# Patient Record
Sex: FEMALE | Race: WHITE | NOT HISPANIC OR LATINO | Employment: UNEMPLOYED | URBAN - METROPOLITAN AREA
[De-identification: names, ages, dates, MRNs, and addresses within clinical notes are randomized per-mention and may not be internally consistent; named-entity substitution may affect disease eponyms.]

---

## 2017-11-20 ENCOUNTER — OFFICE VISIT (OUTPATIENT)
Dept: OBGYN | Facility: CLINIC | Age: 52
End: 2017-11-20
Payer: COMMERCIAL

## 2017-11-20 VITALS
WEIGHT: 165 LBS | HEIGHT: 70 IN | BODY MASS INDEX: 23.62 KG/M2 | DIASTOLIC BLOOD PRESSURE: 66 MMHG | SYSTOLIC BLOOD PRESSURE: 107 MMHG | HEART RATE: 59 BPM

## 2017-11-20 DIAGNOSIS — Z12.4 SCREENING FOR CERVICAL CANCER: ICD-10-CM

## 2017-11-20 DIAGNOSIS — Z12.31 ENCOUNTER FOR SCREENING MAMMOGRAM FOR BREAST CANCER: ICD-10-CM

## 2017-11-20 DIAGNOSIS — Z01.411 ENCOUNTER FOR GYNECOLOGICAL EXAMINATION WITH ABNORMAL FINDING: Primary | ICD-10-CM

## 2017-11-20 DIAGNOSIS — N84.1 CERVICAL POLYP: ICD-10-CM

## 2017-11-20 DIAGNOSIS — Z23 NEED FOR PROPHYLACTIC VACCINATION AND INOCULATION AGAINST INFLUENZA: ICD-10-CM

## 2017-11-20 PROCEDURE — G0124 SCREEN C/V THIN LAYER BY MD: HCPCS | Performed by: OBSTETRICS & GYNECOLOGY

## 2017-11-20 PROCEDURE — 90686 IIV4 VACC NO PRSV 0.5 ML IM: CPT | Performed by: OBSTETRICS & GYNECOLOGY

## 2017-11-20 PROCEDURE — 99213 OFFICE O/P EST LOW 20 MIN: CPT | Mod: 25 | Performed by: OBSTETRICS & GYNECOLOGY

## 2017-11-20 PROCEDURE — 99386 PREV VISIT NEW AGE 40-64: CPT | Mod: 25 | Performed by: OBSTETRICS & GYNECOLOGY

## 2017-11-20 PROCEDURE — 87624 HPV HI-RISK TYP POOLED RSLT: CPT | Performed by: OBSTETRICS & GYNECOLOGY

## 2017-11-20 PROCEDURE — 90471 IMMUNIZATION ADMIN: CPT | Performed by: OBSTETRICS & GYNECOLOGY

## 2017-11-20 PROCEDURE — G0145 SCR C/V CYTO,THINLAYER,RESCR: HCPCS | Performed by: OBSTETRICS & GYNECOLOGY

## 2017-11-20 NOTE — MR AVS SNAPSHOT
After Visit Summary   11/20/2017    Joanie Coe    MRN: [id number]           Patient Information     Date Of Birth          1965        Visit Information        Provider Department      11/20/2017 1:00 PM My Mccollum MD [facility]        Today's Diagnoses     Encounter for gynecological examination with abnormal finding    -  1    Cervical polyp        Need for prophylactic vaccination and inoculation against influenza        Encounter for screening mammogram for breast cancer        Screening for cervical cancer          Care Instructions      Preventive Health Recommendations  Female Ages 50 - 64    Yearly exam: See your health care provider every year in order to  o Review health changes.   o Discuss preventive care.    o Review your medicines if your doctor has prescribed any.      Get a Pap test every three years (unless you have an abnormal result and your provider advises testing more often).    If you get Pap tests with HPV test, you only need to test every 5 years, unless you have an abnormal result.     You do not need a Pap test if your uterus was removed (hysterectomy) and you have not had cancer.    You should be tested each year for STDs (sexually transmitted diseases) if you're at risk.     Have a mammogram every 1 to 2 years.    Have a colonoscopy at age 50, or have a yearly FIT test (stool test). These exams screen for colon cancer.      Have a cholesterol test every 5 years, or more often if advised.    Have a diabetes test (fasting glucose) every three years. If you are at risk for diabetes, you should have this test more often.     If you are at risk for osteoporosis (brittle bone disease), think about having a bone density scan (DEXA).    Shots: Get a flu shot each year. Get a tetanus shot every 10 years.    Nutrition:     Eat at least 5 servings of fruits and vegetables each day.    Eat whole-grain bread, whole-wheat pasta and brown rice instead of  white grains and rice.    Talk to your provider about Calcium and Vitamin D.     Lifestyle    Exercise at least 150 minutes a week (30 minutes a day, 5 days a week). This will help you control your weight and prevent disease.    Limit alcohol to one drink per day.    No smoking.     Wear sunscreen to prevent skin cancer.     See your dentist every six months for an exam and cleaning.    See your eye doctor every 1 to 2 years.            Follow-ups after your visit        Future tests that were ordered for you today     Open Future Orders        Priority Expected Expires Ordered    MA Screening Digital Bilateral Routine  11/20/2018 11/20/2017            Who to contact     If you have questions or need follow up information about today's clinic visit or your schedule please contact [facility] directly at [phone].  Normal or non-critical lab and imaging results will be communicated to you by Clinip[employer] WorldWidehart, letter or phone within 4 business days after the clinic has received the results. If you do not hear from us within 7 days, please contact the clinic through [employer] WorldWidehart or phone. If you have a critical or abnormal lab result, we will notify you by phone as soon as possible.  Submit refill requests through Newsgrape or call your pharmacy and they will forward the refill request to us. Please allow 3 business days for your refill to be completed.          Additional Information About Your Visit        Newsgrape Information     Newsgrape gives you secure access to your electronic health record. If you see a primary care provider, you can also send messages to your care team and make appointments. If you have questions, please call your primary care clinic.  If you do not have a primary care provider, please call [phone] and they will assist you.        Care EveryWhere ID     This is your Care EveryWhere ID. This could be used by other organizations to access your [facility] medical records  [id number]       "  Your Vitals Were     Pulse Height BMI (Body Mass Index)             59 5' 10\" (1.778 m) 23.68 kg/m2          Blood Pressure from Last 3 Encounters:   11/20/17 107/66   07/11/14 108/67   03/28/14 110/81    Weight from Last 3 Encounters:   11/20/17 165 lb (74.8 kg)   07/11/14 159 lb (72.1 kg)   06/04/13 152 lb (68.9 kg)              We Performed the Following     FLU VAC, SPLIT VIRUS IM > 3 YO (QUADRIVALENT) [56061]     HPV High Risk Types DNA Cervical     Pap imaged thin layer screen with HPV - recommended age 30 - 65 years (select HPV order below)     Vaccine Administration, Initial [87536]        Primary Care Provider Office Phone # Fax #    Kalyani Edward [phone]       [facility] [address] [facility] [address]        Equal Access to Services     DIANA CACERES : Hadii aad ku hadasho Soomaali, waaxda luqadaha, qaybta kaalmada adeegyada, cory hinojosa . So [facility] [phone].    ATENCIÓN: Si habla español, tiene a piña disposición servicios gratuitos de asistencia lingüística. Llame al [phone].    We comply with applicable federal civil rights laws and [place] laws. We do not discriminate on the basis of race, color, national origin, age, disability, sex, sexual orientation, or gender identity.            Thank you!     Thank you for choosing [facility]  for your care. Our goal is always to provide you with excellent care. Hearing back from our patients is one way we can continue to improve our services. Please take a few minutes to complete the written survey that you may receive in the mail after your visit with us. Thank you!             Your Updated Medication List - Protect others around you: Learn how to safely use, store and throw away your medicines at www.disposemymeds.org.          This list is accurate as of: 11/20/17  1:39 PM.  Always use your most recent med list.                   Brand Name Dispense " Instructions for use Diagnosis    BIOTIN       Routine general medical examination at a health care facility       CLARITIN 10 MG tablet   Generic drug:  loratadine      1 TABLET DAILY    Preventative health care       ESTROVEN PO           FISH OIL PO           PROBIOTIC DAILY PO

## 2017-11-20 NOTE — NURSING NOTE
"Chief Complaint   Patient presents with     Physical     Flu     injection       Initial /66 (BP Location: Right arm, Patient Position: Chair, Cuff Size: Adult Regular)  Pulse 59  Ht 5' 10\" (1.778 m)  Wt 165 lb (74.8 kg)  BMI 23.68 kg/m2 Estimated body mass index is 23.68 kg/(m^2) as calculated from the following:    Height as of this encounter: 5' 10\" (1.778 m).    Weight as of this encounter: 165 lb (74.8 kg).  Medication Reconciliation: complete     Jeremy Berger CMA      "

## 2017-11-20 NOTE — PROGRESS NOTES
SUBJECTIVE:   CC: Joanie Coe is an 52 year old woman who presents for preventive health visit.   She had a cervical polyp removed in 2008; now having irregular bleeding again    Healthy Habits:    Do you get at least three servings of calcium containing foods daily (dairy, green leafy vegetables, etc.)? yes    Amount of exercise or daily activities, outside of work: 5 day(s) per week    Problems taking medications regularly No    Medication side effects: No    Have you had an eye exam in the past two years? yes    Do you see a dentist twice per year? yes    Do you have sleep apnea, excessive snoring or daytime drowsiness?yes - snore        Today's PHQ-2 Score:   PHQ-2 ( 1999 Pfizer) 11/20/2017 7/11/2014   Q1: Little interest or pleasure in doing things 0 0   Q2: Feeling down, depressed or hopeless 0 0   PHQ-2 Score 0 0       Abuse: Current or Past(Physical, Sexual or Emotional)- No  Do you feel safe in your environment - Yes    Social History   Substance Use Topics     Smoking status: Never Smoker     Smokeless tobacco: Never Used     Alcohol use Yes      Comment: occ     The patient does not drink >3 drinks per day nor >7 drinks per week.    Reviewed orders with patient.  Reviewed health maintenance and updated orders accordingly - Yes  Labs reviewed in EPIC  BP Readings from Last 3 Encounters:   11/20/17 107/66   07/11/14 108/67   03/28/14 110/81    Wt Readings from Last 3 Encounters:   11/20/17 165 lb (74.8 kg)   07/11/14 159 lb (72.1 kg)   06/04/13 152 lb (68.9 kg)                  Patient Active Problem List   Diagnosis     Bleeding between periods     CARDIOVASCULAR SCREENING; LDL GOAL LESS THAN 160     Routine general medical examination at a health care facility     Cervical polyp     Past Surgical History:   Procedure Laterality Date     COLONOSCOPY  3/28/2014    Procedure: COLONOSCOPY;  Colonoscopy;  Surgeon: Everett Gray MD;  Location: [facility]     HC TOOTH EXTRACTION W/FORCEP      root canal  "    PELVIS LAPAROSCOPY,DX         Social History   Substance Use Topics     Smoking status: Never Smoker     Smokeless tobacco: Never Used     Alcohol use Yes      Comment: occ     Family History   Problem Relation Age of Onset     DIABETES Father      Hypertension Father      Lipids Father      CEREBROVASCULAR DISEASE Father      Thyroid Disease Sister                Patient over age 50, mutual decision to screen reflected in health maintenance.      Pertinent mammograms are reviewed under the imaging tab.  History of abnormal Pap smear: NO - age 30- 65 PAP every 3 years recommended    Reviewed and updated as needed this visit by clinical staffTobacco  Allergies  Meds  Med Hx  Surg Hx  Fam Hx  Soc Hx        Reviewed and updated as needed this visit by Provider              ROS:  C: NEGATIVE for fever, chills, change in weight  I: NEGATIVE for worrisome rashes, moles or lesions  E: NEGATIVE for vision changes or irritation  ENT: NEGATIVE for ear, mouth and throat problems  R: NEGATIVE for significant cough or SOB  B: NEGATIVE for masses, tenderness or discharge  CV: NEGATIVE for chest pain, palpitations or peripheral edema  GI: NEGATIVE for nausea, abdominal pain, heartburn, or change in bowel habits  : NEGATIVE for unusual urinary or vaginal symptoms. No vaginal bleeding.  M: NEGATIVE for significant arthralgias or myalgia  N: NEGATIVE for weakness, dizziness or paresthesias  P: NEGATIVE for changes in mood or affect     OBJECTIVE:   /66 (BP Location: Right arm, Patient Position: Chair, Cuff Size: Adult Regular)  Pulse 59  Ht 5' 10\" (1.778 m)  Wt 165 lb (74.8 kg)  BMI 23.68 kg/m2  EXAM:  GENERAL: healthy, alert and no distress  EYES: Eyes grossly normal to inspection, PERRL and conjunctivae and sclerae normal  HENT: ear canals and TM's normal, nose and mouth without ulcers or lesions  NECK: no adenopathy, no asymmetry, masses, or scars and thyroid normal to palpation  RESP: lungs clear to " auscultation - no rales, rhonchi or wheezes  BREAST: normal without masses, tenderness or nipple discharge and no palpable axillary masses or adenopathy  CV: regular rate and rhythm, normal S1 S2, no S3 or S4, no murmur, click or rub, no peripheral edema and peripheral pulses strong  ABDOMEN: soft, nontender, no hepatosplenomegaly, no masses and bowel sounds normal   (female): normal female external genitalia, normal urethral meatus , vaginal mucosa pink, moist, well rugated, cervical polyp 2.5cm transversely oriented and uterus and adnexa normal in palpation; Pap taken  MS: no gross musculoskeletal defects noted, no edema  SKIN: no suspicious lesions or rashes  NEURO: Normal strength and tone, mentation intact and speech normal  PSYCH: mentation appears normal, affect normal/bright    ASSESSMENT/PLAN:       ICD-10-CM    1. Encounter for gynecological examination with abnormal finding Z01.411    2. Cervical polyp N84.1    3. Need for prophylactic vaccination and inoculation against influenza Z23 FLU VAC, SPLIT VIRUS IM > 3 YO (QUADRIVALENT) [70929]     Vaccine Administration, Initial [16380]   4. Encounter for screening mammogram for breast cancer Z12.31 MA Screening Digital Bilateral   5. Screening for cervical cancer Z12.4 Pap imaged thin layer screen with HPV - recommended age 30 - 65 years (select HPV order below)     HPV High Risk Types DNA Cervical       COUNSELING:   Reviewed preventive health counseling, as reflected in patient instructions  Special attention given to:        Treatment for recurrent cervical polyp; due to size and recurrence, I recommend LEEP excision under local anesthesia       Regular exercise       Healthy diet/nutrition       Vision screening       Immunizations    Vaccinated for: Influenza           Osteoporosis Prevention/Bone Health           reports that she has never smoked. She has never used smokeless tobacco.    Estimated body mass index is 23.68 kg/(m^2) as calculated from  "the following:    Height as of this encounter: 5' 10\" (1.778 m).    Weight as of this encounter: 165 lb (74.8 kg).         Counseling Resources:  ATP IV Guidelines  Pooled Cohorts Equation Calculator  Breast Cancer Risk Calculator  FRAX Risk Assessment  ICSI Preventive Guidelines  Dietary Guidelines for Americans, 2010  USDA's MyPlate  ASA Prophylaxis  Lung CA Screening    My Mccollum MD  [facility] Influenza Immunization Documentation    1.  Is the person to be vaccinated sick today?   No    2. Does the person to be vaccinated have an allergy to a component   of the vaccine?   No  Egg Allergy Algorithm Link    3. Has the person to be vaccinated ever had a serious reaction   to influenza vaccine in the past?   No    4. Has the person to be vaccinated ever had Guillain-Barré syndrome?   No    Form completed by Jeremy Berger CMA           "

## 2017-11-27 LAB
COPATH REPORT: NORMAL
PAP: NORMAL

## 2017-11-28 LAB
FINAL DIAGNOSIS: NORMAL
HPV HR 12 DNA CVX QL NAA+PROBE: NEGATIVE
HPV16 DNA SPEC QL NAA+PROBE: NEGATIVE
HPV18 DNA SPEC QL NAA+PROBE: NEGATIVE
SPECIMEN DESCRIPTION: NORMAL

## 2017-12-19 ENCOUNTER — HOSPITAL ENCOUNTER (OUTPATIENT)
Dept: MAMMOGRAPHY | Facility: CLINIC | Age: 52
Discharge: HOME | End: 2017-12-19
Attending: OBSTETRICS & GYNECOLOGY | Admitting: OBSTETRICS & GYNECOLOGY
Payer: COMMERCIAL

## 2017-12-19 DIAGNOSIS — Z12.31 ENCOUNTER FOR SCREENING MAMMOGRAM FOR BREAST CANCER: ICD-10-CM

## 2017-12-19 PROCEDURE — G0202 SCR MAMMO BI INCL CAD: HCPCS

## 2018-02-12 ENCOUNTER — OFFICE VISIT (OUTPATIENT)
Dept: OBGYN | Facility: CLINIC | Age: 53
End: 2018-02-12
Payer: COMMERCIAL

## 2018-02-12 VITALS
WEIGHT: 164 LBS | BODY MASS INDEX: 23.48 KG/M2 | HEART RATE: 61 BPM | SYSTOLIC BLOOD PRESSURE: 113 MMHG | HEIGHT: 70 IN | DIASTOLIC BLOOD PRESSURE: 73 MMHG | TEMPERATURE: 97 F | RESPIRATION RATE: 18 BRPM

## 2018-02-12 DIAGNOSIS — R87.619 ENDOMETRIAL CELLS ON CERVICAL PAP SMEAR INCONSISTENT W/LMP: ICD-10-CM

## 2018-02-12 DIAGNOSIS — N84.1 CERVICAL POLYP: Primary | ICD-10-CM

## 2018-02-12 PROCEDURE — 88307 TISSUE EXAM BY PATHOLOGIST: CPT | Performed by: OBSTETRICS & GYNECOLOGY

## 2018-02-12 PROCEDURE — 58100 BIOPSY OF UTERUS LINING: CPT | Performed by: OBSTETRICS & GYNECOLOGY

## 2018-02-12 PROCEDURE — 57522 CONIZATION OF CERVIX: CPT | Performed by: OBSTETRICS & GYNECOLOGY

## 2018-02-12 PROCEDURE — 88305 TISSUE EXAM BY PATHOLOGIST: CPT | Performed by: OBSTETRICS & GYNECOLOGY

## 2018-02-12 NOTE — PROGRESS NOTES
SUBJECTIVE:  Joanie is a 52 year old   female who presents for LEEP procedure.  Her last Pap was normal with endometrial cells on 11/17.   She has had a cervical polyp removed in the past, but it has recurred on last pelvic exam.*      PROCEDURE:    After obtaining written informed consent for both LEEP as well as endometrial biopsy,  the patient was placed in the supine position, and a coated speculum placed within the vagina to visualize the cervix.  The cervix was then treated with a Lugol's solution to identify the entire transformation zone.  The cervix was then infiltrated with a mixture of 1% lidocaine/2% lidocaine with epinephrine, until anesthesia was achieved.  Utilizing a Loop cautery, the entire polyp complex, including smaller polyps in the canal  was excised and sent for pathologic examination.  Hemostasis was obtained with monopolar cautery and Monsel's solution. The pt tolerated the procedure well.    The cervix was visualized with the speculum, and cleansed with an iodine solution.  The anterior cervix was grasped with a tenaculum and a pipelle advanced into the uterine cavity, with a sounded depth of 7 cm.  A representative sample was aspirated from the cavity and sent for pathological examination.      ASSESSMENT:    Cervical polyp  Endometrial cells on Pap smear    PLAN:  Will check the results of the pathology from LEEP and uterine biopsy and contact the patient.    Bleeding precautions were reviewed with the patient.  Post biopsy instructions reviewed  My Mccollum MD  [facility] [place]

## 2018-02-12 NOTE — MR AVS SNAPSHOT
"              After Visit Summary   2/12/2018    Joanie Coe    MRN: [id number]           Patient Information     Date Of Birth          1965        Visit Information        Provider Department      2/12/2018 2:30 PM My Mccollum MD; [facility]        Today's Diagnoses     Cervical polyp    -  1    Endometrial cells on cervical Pap smear inconsistent w/LMP           Follow-ups after your visit        Who to contact     If you have questions or need follow up information about today's clinic visit or your schedule please contact [facility] directly at [phone].  Normal or non-critical lab and imaging results will be communicated to you by MyChart, letter or phone within 4 business days after the clinic has received the results. If you do not hear from us within 7 days, please contact the clinic through VidPayhart or phone. If you have a critical or abnormal lab result, we will notify you by phone as soon as possible.  Submit refill requests through [employer] or call your pharmacy and they will forward the refill request to us. Please allow 3 business days for your refill to be completed.          Additional Information About Your Visit        MyChart Information     [employer] gives you secure access to your electronic health record. If you see a primary care provider, you can also send messages to your care team and make appointments. If you have questions, please call your primary care clinic.  If you do not have a primary care provider, please call [phone] and they will assist you.        Care EveryWhere ID     This is your Care EveryWhere ID. This could be used by other organizations to access your [facility] medical records  [id number]        Your Vitals Were     Pulse Temperature Respirations Height BMI (Body Mass Index)       61 97  F (36.1  C) (Tympanic) 18 5' 10\" (1.778 m) 23.53 kg/m2        Blood Pressure from Last 3 Encounters:   02/12/18 113/73 "   11/20/17 107/66   07/11/14 108/67    Weight from Last 3 Encounters:   02/12/18 164 lb (74.4 kg)   11/20/17 165 lb (74.8 kg)   07/11/14 159 lb (72.1 kg)              We Performed the Following     ENDOMETRIAL BIOPSY W/O CERVICAL DILATION     LEEP without Colposcopy     Surgical pathology exam        Primary Care Provider    Kalyani Edward MD       No address on file        Equal Access to Services     [facility] NICKI : Hadii aad ku hadasho Soomaali, waaxda luqadaha, qaybta kaalmada adeegyada, waxay idiin hayaan bre chatmanalyciajacob hinojosa . So [facility] [phone].    ATENCIÓN: Si laly hayes, tiene a piña disposición servicios gratuitos de asistencia lingüística. Llame al [phone].    We comply with applicable federal civil rights laws and [place] laws. We do not discriminate on the basis of race, color, national origin, age, disability, sex, sexual orientation, or gender identity.            Thank you!     Thank you for choosing [facility]  for your care. Our goal is always to provide you with excellent care. Hearing back from our patients is one way we can continue to improve our services. Please take a few minutes to complete the written survey that you may receive in the mail after your visit with us. Thank you!             Your Updated Medication List - Protect others around you: Learn how to safely use, store and throw away your medicines at www.disposemymeds.org.          This list is accurate as of 2/12/18  2:52 PM.  Always use your most recent med list.                   Brand Name Dispense Instructions for use Diagnosis    BIOTIN       Routine general medical examination at a health care facility       CLARITIN 10 MG tablet   Generic drug:  loratadine      1 TABLET DAILY    Preventative health care       ESTROVEN PO           FISH OIL PO           METAMUCIL FREE & NATURAL PO           PROBIOTIC DAILY PO

## 2018-02-15 LAB — COPATH REPORT: NORMAL

## 2018-06-29 ENCOUNTER — HOSPITAL ENCOUNTER (OUTPATIENT)
Dept: MRI IMAGING | Facility: CLINIC | Age: 53
Discharge: HOME | End: 2018-06-29
Attending: PODIATRIST | Admitting: PODIATRIST
Payer: COMMERCIAL

## 2018-06-29 DIAGNOSIS — M25.571 ANKLE PAIN, RIGHT: ICD-10-CM

## 2018-06-29 PROCEDURE — 73721 MRI JNT OF LWR EXTRE W/O DYE: CPT | Mod: RT

## 2018-07-09 ENCOUNTER — TRANSFERRED RECORDS (OUTPATIENT)
Dept: HEALTH INFORMATION MANAGEMENT | Facility: CLINIC | Age: 53
End: 2018-07-09

## 2018-07-23 ENCOUNTER — OFFICE VISIT (OUTPATIENT)
Dept: FAMILY MEDICINE | Facility: CLINIC | Age: 53
End: 2018-07-23
Payer: COMMERCIAL

## 2018-07-23 VITALS
DIASTOLIC BLOOD PRESSURE: 72 MMHG | WEIGHT: 164.6 LBS | RESPIRATION RATE: 14 BRPM | BODY MASS INDEX: 23.56 KG/M2 | SYSTOLIC BLOOD PRESSURE: 114 MMHG | HEART RATE: 72 BPM | HEIGHT: 70 IN | TEMPERATURE: 98.4 F | OXYGEN SATURATION: 98 %

## 2018-07-23 DIAGNOSIS — G89.29 CHRONIC PAIN OF RIGHT ANKLE: ICD-10-CM

## 2018-07-23 DIAGNOSIS — M25.571 CHRONIC PAIN OF RIGHT ANKLE: ICD-10-CM

## 2018-07-23 DIAGNOSIS — Z01.818 PREOP GENERAL PHYSICAL EXAM: Primary | ICD-10-CM

## 2018-07-23 PROCEDURE — 99213 OFFICE O/P EST LOW 20 MIN: CPT | Performed by: INTERNAL MEDICINE

## 2018-07-23 NOTE — PATIENT INSTRUCTIONS
Avoid ibuprofen for 1 day prior to surgery, avoid naproxen for 3 days prior, and avoid products containing aspirin for 1 week before surgery.  Tylenol is okay to take for pain if needed.     Before Your Surgery      Call your surgeon if there is any change in your health. This includes signs of a cold or flu (such as a sore throat, runny nose, cough, rash or fever).    Do not smoke, drink alcohol or take over the counter medicine (unless your surgeon or primary care doctor tells you to) for the 24 hours before and after surgery.    If you take prescribed drugs: Follow your doctor s orders about which medicines to take and which to stop until after surgery.    Eating and drinking prior to surgery: follow the instructions from your surgeon    Take a shower or bath the night before surgery. Use the soap your surgeon gave you to gently clean your skin. If you do not have soap from your surgeon, use your regular soap. Do not shave or scrub the surgery site.  Wear clean pajamas and have clean sheets on your bed.

## 2018-07-23 NOTE — MR AVS SNAPSHOT
After Visit Summary   7/23/2018    Joanie Coe    MRN: [id number]           Patient Information     Date Of Birth          1965        Visit Information        Provider Department      7/23/2018 4:40 PM Farhan Coyne MD [facility]        Today's Diagnoses     Preop general physical exam    -  1      Care Instructions    Avoid ibuprofen for 1 day prior to surgery, avoid naproxen for 3 days prior, and avoid products containing aspirin for 1 week before surgery.  Tylenol is okay to take for pain if needed.     Before Your Surgery      Call your surgeon if there is any change in your health. This includes signs of a cold or flu (such as a sore throat, runny nose, cough, rash or fever).    Do not smoke, drink alcohol or take over the counter medicine (unless your surgeon or primary care doctor tells you to) for the 24 hours before and after surgery.    If you take prescribed drugs: Follow your doctor s orders about which medicines to take and which to stop until after surgery.    Eating and drinking prior to surgery: follow the instructions from your surgeon    Take a shower or bath the night before surgery. Use the soap your surgeon gave you to gently clean your skin. If you do not have soap from your surgeon, use your regular soap. Do not shave or scrub the surgery site.  Wear clean pajamas and have clean sheets on your bed.           Follow-ups after your visit        Who to contact     If you have questions or need follow up information about today's clinic visit or your schedule please contact [facility] directly at [phone].  Normal or non-critical lab and imaging results will be communicated to you by MyChart, letter or phone within 4 business days after the clinic has received the results. If you do not hear from us within 7 days, please contact the clinic through MyChart or phone. If you have a critical or abnormal lab result, we will notify you by phone as soon  "as possible.  Submit refill requests through [employer] or call your pharmacy and they will forward the refill request to us. Please allow 3 business days for your refill to be completed.          Additional Information About Your Visit        [employer] Information     [employer] gives you secure access to your electronic health record. If you see a primary care provider, you can also send messages to your care team and make appointments. If you have questions, please call your primary care clinic.  If you do not have a primary care provider, please call [phone] and they will assist you.        Care EveryWhere ID     This is your Care EveryWhere ID. This could be used by other organizations to access your [facility] medical records  [id number]        Your Vitals Were     Pulse Temperature Respirations Height Pulse Oximetry BMI (Body Mass Index)    72 98.4  F (36.9  C) (Tympanic) 14 5' 10\" (1.778 m) 98% 23.62 kg/m2       Blood Pressure from Last 3 Encounters:   07/23/18 114/72   02/12/18 113/73   11/20/17 107/66    Weight from Last 3 Encounters:   07/23/18 164 lb 9.6 oz (74.7 kg)   02/12/18 164 lb (74.4 kg)   11/20/17 165 lb (74.8 kg)              Today, you had the following     No orders found for display       Primary Care Provider    Kalyani Edward MD       No address on file        Equal Access to Services     [facility]: Hadii aad ku hadasho Soomaali, waaxda luqadaha, qaybta kaalmada adeashleyda, cory hinojosa . So [facility] [phone].    ATENCIÓN: Si habla español, tiene a piña disposición servicios gratuitos de asistencia lingüística. Llame al [phone].    We comply with applicable federal civil rights laws and [place] laws. We do not discriminate on the basis of race, color, national origin, age, disability, sex, sexual orientation, or gender identity.            Thank you!     Thank you for choosing [facility]  for your care. Our goal is always to " provide you with excellent care. Hearing back from our patients is one way we can continue to improve our services. Please take a few minutes to complete the written survey that you may receive in the mail after your visit with us. Thank you!             Your Updated Medication List - Protect others around you: Learn how to safely use, store and throw away your medicines at www.disposemymeds.org.          This list is accurate as of 7/23/18  5:13 PM.  Always use your most recent med list.                   Brand Name Dispense Instructions for use Diagnosis    BIOTIN       Routine general medical examination at a health care facility       CLARITIN 10 MG tablet   Generic drug:  loratadine      1 TABLET DAILY    Preventative health care       ESTROVEN PO           FISH OIL PO           METAMUCIL FREE & NATURAL PO           PROBIOTIC DAILY PO

## 2018-07-23 NOTE — PROGRESS NOTES
[facility]  [address] [address]  [phone]  Dept: [phone]    PRE-OP EVALUATION:  Chief Complaint   Patient presents with     Pre-Op Exam     DOS 18 [facility], right ankle repair      Today's date: 2018    Joanie Coe (: 1965) presents for pre-operative evaluation assessment as requested by Dr. Javy Gross.  She requires evaluation and anesthesia risk assessment prior to undergoing surgery/procedure for treatment of lateral ligament issue .    Proposed Surgery/ Procedure: ankle scope, debridement lateral ligament  Date of Surgery/ Procedure: 18  Time of Surgery/ Procedure: unknown   Hospital/Surgical Facility: [facility]     Primary Physician: Kalyani Edward   Type of Anesthesia Anticipated: to be determined    Patient has a Health Care Directive or Living Will:  NO, patient requested information today, printed and given.     1. NO - Do you have a history of heart attack, stroke, stent, bypass or surgery on an artery in the head, neck, heart or legs?  2. NO - Do you ever have any pain or discomfort in your chest?  3. NO - Do you have a history of  Heart Failure?  4. NO - Are you troubled by shortness of breath when: walking on the level, up a slight hill or at night?  5. NO - Do you currently have a cold, bronchitis or other respiratory infection?  6. NO - Do you have a cough, shortness of breath or wheezing?  7. NO - Do you sometimes get pains in the calves of your legs when you walk?  8. NO - Do you or anyone in your family have previous history of blood clots?  9. NO - Do you or does anyone in your family have a serious bleeding problem such as prolonged bleeding following surgeries or cuts?  10. NO - Have you ever had problems with anemia or been told to take iron pills?  11. NO - Have you had any abnormal blood loss such as black, tarry or bloody stools, or abnormal vaginal bleeding?  12. NO - Have you ever had a  blood transfusion?  13. NO - Have you or any of your relatives ever had problems with anesthesia?  14. NO - Do you have sleep apnea, excessive snoring or daytime drowsiness?  15. NO - Do you have any prosthetic heart valves?  16. NO - Do you have prosthetic joints?  17. NO - Is there any chance that you may be pregnant?      HPI:     HPI related to upcoming procedure: Started having right lateral ankle pain in April after doing a lot of rock climbing.  She has been using a brace but pain continues and she reports that podiatry found that some ligaments were  from the bone so surgical repair is planned.  She is otherwise healthy and feeling well.         MEDICAL HISTORY:     Patient Active Problem List    Diagnosis Date Noted     Cervical polyp 11/20/2017     Priority: Medium     Removed in 2008; recurrent 2017  LEEP removal performed 2/12/2018         CARDIOVASCULAR SCREENING; LDL GOAL LESS THAN 160 10/31/2010     Priority: Medium      Past Medical History:   Diagnosis Date     NO ACTIVE PROBLEMS      Past Surgical History:   Procedure Laterality Date     COLONOSCOPY  3/28/2014    Procedure: COLONOSCOPY;  Colonoscopy;  Surgeon: Everett Gray MD;  Location: [facility]     HC TOOTH EXTRACTION W/FORCEP      root canal     PELVIS LAPAROSCOPY,DX       Current Outpatient Prescriptions   Medication Sig Dispense Refill     CLARITIN 10 MG OR TABS 1 TABLET DAILY       BIOTIN        Nutritional Supplements (ESTROVEN PO)        Omega-3 Fatty Acids (FISH OIL PO)        Probiotic Product (PROBIOTIC DAILY PO)        Psyllium (METAMUCIL FREE & NATURAL PO)        OTC products: NSAIDS    Allergies   Allergen Reactions     Nka [No Known Allergies]       Latex Allergy: NO    Social History   Substance Use Topics     Smoking status: Never Smoker     Smokeless tobacco: Never Used     Alcohol use Yes      Comment: occ     History   Drug Use No       REVIEW OF SYSTEMS:   Constitutional, neuro, ENT, endocrine, pulmonary,  "cardiac, gastrointestinal, genitourinary, musculoskeletal, integument and psychiatric systems are negative, except as otherwise noted.    EXAM:   /72 (BP Location: Right arm, Patient Position: Chair, Cuff Size: Adult Regular)  Pulse 72  Temp 98.4  F (36.9  C) (Tympanic)  Resp 14  Ht 5' 10\" (1.778 m)  Wt 164 lb 9.6 oz (74.7 kg)  SpO2 98%  BMI 23.62 kg/m2      GENERAL APPEARANCE: healthy, alert and no distress     HENT: normal ear canals and TMs, nose and mouth without ulcers or lesions     NECK: no adenopathy, no asymmetry, masses, or scars     RESP: lungs clear to auscultation - no rales, rhonchi or wheezes     CV: regular rates and rhythm, normal S1 S2, no S3 or S4 and no murmur, click or rub -     ABDOMEN:  soft, nontender, no HSM or masses and bowel sounds normal     MS: right ankle in brace, otherwise no gross deformities noted       NEURO: mentation intact and speech normal     PSYCH: mentation appears normal. and affect normal/bright     LYMPHATICS: No cervical or supraclavicular nodes     DIAGNOSTICS:   No labs or EKG required for low risk surgery (cataract, skin procedure, breast biopsy, etc)    No results for input(s): HGB, PLT, INR, NA, POTASSIUM, CR, A1C in the last 13921 hours.     IMPRESSION:   Reason for surgery/procedure: Right ankle scope with ligament repair  Diagnosis/reason for consult: Pre-op eval    The proposed surgical procedure is considered LOW risk.    REVISED CARDIAC RISK INDEX  The patient has the following serious cardiovascular risks for perioperative complications such as (MI, PE, VFib and 3  AV Block):  No serious cardiac risks  INTERPRETATION: 0 risks: Class I (very low risk - 0.4% complication rate)    The patient has the following additional risks for perioperative complications:  No identified additional risks      ICD-10-CM    1. Preop general physical exam Z01.818    2. Chronic pain of right ankle M25.571     G89.29        RECOMMENDATIONS:       --Patient is to take " all scheduled medications on the day of surgery EXCEPT for modifications listed below.    Anticoagulant or Antiplatelet Medication Use  NSAIDS: Ibuprofen (Motrin):         Stop one day prior to surgery        APPROVAL GIVEN to proceed with proposed procedure, without further diagnostic evaluation       Signed Electronically by: Farhan Coyne MD    Copy of this evaluation report is provided to requesting physician.    [facility] Preop Guidelines    Revised Cardiac Risk Index

## 2018-08-07 ENCOUNTER — ANESTHESIA EVENT (OUTPATIENT)
Dept: SURGERY | Facility: CLINIC | Age: 53
End: 2018-08-07
Payer: COMMERCIAL

## 2018-08-09 ENCOUNTER — ANESTHESIA (OUTPATIENT)
Dept: SURGERY | Facility: CLINIC | Age: 53
End: 2018-08-09
Payer: COMMERCIAL

## 2018-08-09 ENCOUNTER — HOSPITAL ENCOUNTER (OUTPATIENT)
Facility: CLINIC | Age: 53
Discharge: HOME | End: 2018-08-09
Attending: PODIATRIST | Admitting: PODIATRIST
Payer: COMMERCIAL

## 2018-08-09 VITALS
SYSTOLIC BLOOD PRESSURE: 125 MMHG | TEMPERATURE: 97.4 F | WEIGHT: 164 LBS | RESPIRATION RATE: 16 BRPM | DIASTOLIC BLOOD PRESSURE: 68 MMHG | OXYGEN SATURATION: 99 % | BODY MASS INDEX: 23.48 KG/M2 | HEIGHT: 70 IN

## 2018-08-09 DIAGNOSIS — Z98.890 STATUS POST REPAIR OF LIGAMENT OF ANKLE: Primary | ICD-10-CM

## 2018-08-09 PROCEDURE — 37000008 ZZH ANESTHESIA TECHNICAL FEE, 1ST 30 MIN: Performed by: PODIATRIST

## 2018-08-09 PROCEDURE — 25000128 H RX IP 250 OP 636: Performed by: NURSE ANESTHETIST, CERTIFIED REGISTERED

## 2018-08-09 PROCEDURE — 40000305 ZZH STATISTIC PRE PROC ASSESS I: Performed by: PODIATRIST

## 2018-08-09 PROCEDURE — 37000009 ZZH ANESTHESIA TECHNICAL FEE, EACH ADDTL 15 MIN: Performed by: PODIATRIST

## 2018-08-09 PROCEDURE — 25000125 ZZHC RX 250: Performed by: NURSE ANESTHETIST, CERTIFIED REGISTERED

## 2018-08-09 PROCEDURE — 71000027 ZZH RECOVERY PHASE 2 EACH 15 MINS: Performed by: PODIATRIST

## 2018-08-09 PROCEDURE — 36000093 ZZH SURGERY LEVEL 4 1ST 30 MIN: Performed by: PODIATRIST

## 2018-08-09 PROCEDURE — 25000128 H RX IP 250 OP 636: Performed by: PODIATRIST

## 2018-08-09 PROCEDURE — 25000564 ZZH DESFLURANE, EA 15 MIN: Performed by: PODIATRIST

## 2018-08-09 PROCEDURE — C1713 ANCHOR/SCREW BN/BN,TIS/BN: HCPCS | Performed by: PODIATRIST

## 2018-08-09 PROCEDURE — 25000125 ZZHC RX 250: Performed by: PODIATRIST

## 2018-08-09 PROCEDURE — 27210794 ZZH OR GENERAL SUPPLY STERILE: Performed by: PODIATRIST

## 2018-08-09 PROCEDURE — 36000063 ZZH SURGERY LEVEL 4 EA 15 ADDTL MIN: Performed by: PODIATRIST

## 2018-08-09 PROCEDURE — 71000012 ZZH RECOVERY PHASE 1 LEVEL 1 FIRST HR: Performed by: PODIATRIST

## 2018-08-09 DEVICE — IMP KIT REPAIR LIGAMENT AUGMENTATION INT BRACE AR-1688-CP: Type: IMPLANTABLE DEVICE | Site: ANKLE | Status: FUNCTIONAL

## 2018-08-09 DEVICE — IMPLANTABLE DEVICE: Type: IMPLANTABLE DEVICE | Site: ANKLE | Status: FUNCTIONAL

## 2018-08-09 RX ORDER — CEFAZOLIN SODIUM 2 G/100ML
2 INJECTION, SOLUTION INTRAVENOUS
Status: COMPLETED | OUTPATIENT
Start: 2018-08-09 | End: 2018-08-09

## 2018-08-09 RX ORDER — FENTANYL CITRATE 50 UG/ML
INJECTION, SOLUTION INTRAMUSCULAR; INTRAVENOUS PRN
Status: DISCONTINUED | OUTPATIENT
Start: 2018-08-09 | End: 2018-08-09

## 2018-08-09 RX ORDER — ONDANSETRON 2 MG/ML
INJECTION INTRAMUSCULAR; INTRAVENOUS PRN
Status: DISCONTINUED | OUTPATIENT
Start: 2018-08-09 | End: 2018-08-09

## 2018-08-09 RX ORDER — ROPIVACAINE HYDROCHLORIDE 5 MG/ML
INJECTION, SOLUTION EPIDURAL; INFILTRATION; PERINEURAL PRN
Status: DISCONTINUED | OUTPATIENT
Start: 2018-08-09 | End: 2018-08-09

## 2018-08-09 RX ORDER — HYDROXYZINE HYDROCHLORIDE 25 MG/1
25 TABLET, FILM COATED ORAL EVERY 6 HOURS PRN
Qty: 26 TABLET | Refills: 0 | Status: SHIPPED | OUTPATIENT
Start: 2018-08-09 | End: 2021-02-19

## 2018-08-09 RX ORDER — SODIUM CHLORIDE, SODIUM LACTATE, POTASSIUM CHLORIDE, CALCIUM CHLORIDE 600; 310; 30; 20 MG/100ML; MG/100ML; MG/100ML; MG/100ML
INJECTION, SOLUTION INTRAVENOUS CONTINUOUS
Status: DISCONTINUED | OUTPATIENT
Start: 2018-08-09 | End: 2018-08-09 | Stop reason: HOSPADM

## 2018-08-09 RX ORDER — GLYCOPYRROLATE 0.2 MG/ML
INJECTION, SOLUTION INTRAMUSCULAR; INTRAVENOUS PRN
Status: DISCONTINUED | OUTPATIENT
Start: 2018-08-09 | End: 2018-08-09

## 2018-08-09 RX ORDER — ONDANSETRON 2 MG/ML
4 INJECTION INTRAMUSCULAR; INTRAVENOUS EVERY 30 MIN PRN
Status: DISCONTINUED | OUTPATIENT
Start: 2018-08-09 | End: 2018-08-09 | Stop reason: HOSPADM

## 2018-08-09 RX ORDER — LIDOCAINE HYDROCHLORIDE AND EPINEPHRINE 15; 5 MG/ML; UG/ML
INJECTION, SOLUTION EPIDURAL PRN
Status: DISCONTINUED | OUTPATIENT
Start: 2018-08-09 | End: 2018-08-09

## 2018-08-09 RX ORDER — ALBUTEROL SULFATE 0.83 MG/ML
2.5 SOLUTION RESPIRATORY (INHALATION) EVERY 4 HOURS PRN
Status: DISCONTINUED | OUTPATIENT
Start: 2018-08-09 | End: 2018-08-09 | Stop reason: HOSPADM

## 2018-08-09 RX ORDER — NALOXONE HYDROCHLORIDE 0.4 MG/ML
.1-.4 INJECTION, SOLUTION INTRAMUSCULAR; INTRAVENOUS; SUBCUTANEOUS
Status: DISCONTINUED | OUTPATIENT
Start: 2018-08-09 | End: 2018-08-09 | Stop reason: HOSPADM

## 2018-08-09 RX ORDER — PROPOFOL 10 MG/ML
INJECTION, EMULSION INTRAVENOUS PRN
Status: DISCONTINUED | OUTPATIENT
Start: 2018-08-09 | End: 2018-08-09

## 2018-08-09 RX ORDER — OXYCODONE AND ACETAMINOPHEN 5; 325 MG/1; MG/1
1 TABLET ORAL
Status: DISCONTINUED | OUTPATIENT
Start: 2018-08-09 | End: 2018-08-09 | Stop reason: HOSPADM

## 2018-08-09 RX ORDER — FENTANYL CITRATE 50 UG/ML
25-50 INJECTION, SOLUTION INTRAMUSCULAR; INTRAVENOUS
Status: DISCONTINUED | OUTPATIENT
Start: 2018-08-09 | End: 2018-08-09 | Stop reason: HOSPADM

## 2018-08-09 RX ORDER — BUPIVACAINE HYDROCHLORIDE 5 MG/ML
INJECTION, SOLUTION PERINEURAL PRN
Status: DISCONTINUED | OUTPATIENT
Start: 2018-08-09 | End: 2018-08-09 | Stop reason: HOSPADM

## 2018-08-09 RX ORDER — ONDANSETRON 4 MG/1
4 TABLET, ORALLY DISINTEGRATING ORAL EVERY 30 MIN PRN
Status: DISCONTINUED | OUTPATIENT
Start: 2018-08-09 | End: 2018-08-09 | Stop reason: HOSPADM

## 2018-08-09 RX ORDER — OXYCODONE AND ACETAMINOPHEN 5; 325 MG/1; MG/1
1-2 TABLET ORAL EVERY 4 HOURS PRN
Qty: 26 TABLET | Refills: 0 | Status: SHIPPED | OUTPATIENT
Start: 2018-08-09 | End: 2021-02-19

## 2018-08-09 RX ORDER — HYDROMORPHONE HYDROCHLORIDE 1 MG/ML
.3-.5 INJECTION, SOLUTION INTRAMUSCULAR; INTRAVENOUS; SUBCUTANEOUS EVERY 10 MIN PRN
Status: DISCONTINUED | OUTPATIENT
Start: 2018-08-09 | End: 2018-08-09 | Stop reason: HOSPADM

## 2018-08-09 RX ORDER — CEFAZOLIN SODIUM 1 G/50ML
1 INJECTION, SOLUTION INTRAVENOUS SEE ADMIN INSTRUCTIONS
Status: DISCONTINUED | OUTPATIENT
Start: 2018-08-09 | End: 2018-08-09 | Stop reason: HOSPADM

## 2018-08-09 RX ORDER — MEPERIDINE HYDROCHLORIDE 50 MG/ML
12.5 INJECTION INTRAMUSCULAR; INTRAVENOUS; SUBCUTANEOUS
Status: DISCONTINUED | OUTPATIENT
Start: 2018-08-09 | End: 2018-08-09 | Stop reason: HOSPADM

## 2018-08-09 RX ORDER — LIDOCAINE HYDROCHLORIDE 10 MG/ML
INJECTION, SOLUTION EPIDURAL; INFILTRATION; INTRACAUDAL; PERINEURAL PRN
Status: DISCONTINUED | OUTPATIENT
Start: 2018-08-09 | End: 2018-08-09

## 2018-08-09 RX ORDER — LIDOCAINE 40 MG/G
CREAM TOPICAL
Status: DISCONTINUED | OUTPATIENT
Start: 2018-08-09 | End: 2018-08-09 | Stop reason: HOSPADM

## 2018-08-09 RX ORDER — LIDOCAINE HYDROCHLORIDE 10 MG/ML
INJECTION, SOLUTION INFILTRATION; PERINEURAL PRN
Status: DISCONTINUED | OUTPATIENT
Start: 2018-08-09 | End: 2018-08-09

## 2018-08-09 RX ORDER — DEXAMETHASONE SODIUM PHOSPHATE 4 MG/ML
INJECTION, SOLUTION INTRA-ARTICULAR; INTRALESIONAL; INTRAMUSCULAR; INTRAVENOUS; SOFT TISSUE PRN
Status: DISCONTINUED | OUTPATIENT
Start: 2018-08-09 | End: 2018-08-09

## 2018-08-09 RX ADMIN — FENTANYL CITRATE 150 MCG: 50 INJECTION, SOLUTION INTRAMUSCULAR; INTRAVENOUS at 13:43

## 2018-08-09 RX ADMIN — ONDANSETRON 4 MG: 2 INJECTION INTRAMUSCULAR; INTRAVENOUS at 17:24

## 2018-08-09 RX ADMIN — GLYCOPYRROLATE 0.2 MG: 0.2 INJECTION, SOLUTION INTRAMUSCULAR; INTRAVENOUS at 13:43

## 2018-08-09 RX ADMIN — MIDAZOLAM HYDROCHLORIDE 2 MG: 1 INJECTION, SOLUTION INTRAMUSCULAR; INTRAVENOUS at 13:09

## 2018-08-09 RX ADMIN — CEFAZOLIN SODIUM 2 G: 2 INJECTION, SOLUTION INTRAVENOUS at 13:35

## 2018-08-09 RX ADMIN — MIDAZOLAM HYDROCHLORIDE 2 MG: 1 INJECTION, SOLUTION INTRAMUSCULAR; INTRAVENOUS at 13:35

## 2018-08-09 RX ADMIN — SODIUM CHLORIDE, POTASSIUM CHLORIDE, SODIUM LACTATE AND CALCIUM CHLORIDE: 600; 310; 30; 20 INJECTION, SOLUTION INTRAVENOUS at 14:24

## 2018-08-09 RX ADMIN — ONDANSETRON 4 MG: 2 INJECTION INTRAMUSCULAR; INTRAVENOUS at 13:43

## 2018-08-09 RX ADMIN — SODIUM CHLORIDE, POTASSIUM CHLORIDE, SODIUM LACTATE AND CALCIUM CHLORIDE: 600; 310; 30; 20 INJECTION, SOLUTION INTRAVENOUS at 12:38

## 2018-08-09 RX ADMIN — ROPIVACAINE HYDROCHLORIDE 10 ML: 5 INJECTION, SOLUTION EPIDURAL; INFILTRATION; PERINEURAL at 13:20

## 2018-08-09 RX ADMIN — LIDOCAINE HYDROCHLORIDE AND EPINEPHRINE 5 ML: 15; 5 INJECTION, SOLUTION EPIDURAL at 13:17

## 2018-08-09 RX ADMIN — LIDOCAINE HYDROCHLORIDE 1 ML: 10 INJECTION, SOLUTION EPIDURAL; INFILTRATION; INTRACAUDAL; PERINEURAL at 12:38

## 2018-08-09 RX ADMIN — FENTANYL CITRATE 100 MCG: 50 INJECTION, SOLUTION INTRAMUSCULAR; INTRAVENOUS at 13:09

## 2018-08-09 RX ADMIN — ROPIVACAINE HYDROCHLORIDE 25 ML: 5 INJECTION, SOLUTION EPIDURAL; INFILTRATION; PERINEURAL at 13:18

## 2018-08-09 RX ADMIN — PROPOFOL 150 MG: 10 INJECTION, EMULSION INTRAVENOUS at 13:43

## 2018-08-09 RX ADMIN — MIDAZOLAM HYDROCHLORIDE 1 MG: 1 INJECTION, SOLUTION INTRAMUSCULAR; INTRAVENOUS at 13:43

## 2018-08-09 RX ADMIN — LIDOCAINE HYDROCHLORIDE 2 ML: 10 INJECTION, SOLUTION EPIDURAL; INFILTRATION; INTRACAUDAL; PERINEURAL at 13:15

## 2018-08-09 RX ADMIN — DEXAMETHASONE SODIUM PHOSPHATE 4 MG: 4 INJECTION, SOLUTION INTRA-ARTICULAR; INTRALESIONAL; INTRAMUSCULAR; INTRAVENOUS; SOFT TISSUE at 13:43

## 2018-08-09 RX ADMIN — LIDOCAINE HYDROCHLORIDE 50 MG: 10 INJECTION, SOLUTION INFILTRATION; PERINEURAL at 13:43

## 2018-08-09 NOTE — ANESTHESIA PREPROCEDURE EVALUATION
Anesthesia Evaluation     . Pt has had prior anesthetic. Type: General    No history of anesthetic complications          ROS/MED HX    ENT/Pulmonary:  - neg pulmonary ROS     Neurologic:  - neg neurologic ROS     Cardiovascular:  - neg cardiovascular ROS       METS/Exercise Tolerance:  >4 METS   Hematologic:  - neg hematologic  ROS       Musculoskeletal:  - neg musculoskeletal ROS       GI/Hepatic:  - neg GI/hepatic ROS       Renal/Genitourinary:         Endo:  - neg endo ROS       Psychiatric:  - neg psychiatric ROS       Infectious Disease:  - neg infectious disease ROS       Malignancy:      - no malignancy   Other:    - neg other ROS                 Physical Exam  Normal systems: pulmonary    Airway   Mallampati: II  TM distance: >3 FB  Neck ROM: full    Dental     Cardiovascular       Pulmonary                     Anesthesia Plan      History & Physical Review      ASA Status:  1 .    NPO Status:  > 6 hours    Plan for General, LMA and Periph. Nerve Block for postop pain with Intravenous and Propofol induction. Maintenance will be Balanced.    PONV prophylaxis:  Ondansetron (or other 5HT-3) and Dexamethasone or Solumedrol       Postoperative Care  Postoperative pain management:  IV analgesics, Oral pain medications and Peripheral nerve block (Single Shot).      Consents  Anesthetic plan, risks, benefits and alternatives discussed with:  Patient..                          .

## 2018-08-09 NOTE — DISCHARGE INSTRUCTIONS
Same Day Surgery Discharge Instructions  Special Precautions After Surgery - Adult    1. It is not unusual to feel lightheaded or faint, up to 24 hours after surgery or while taking pain medication.  If you have these symptoms; sit for a few minutes before standing and have someone assist you when getting up.  2. You should rest and relax for the next 24 hours and must have someone stay with you for at least 24 hours after your discharge.  3. DO NOT DRIVE any vehicle or operate mechanical equipment for 24 hours following the end of your surgery.  DO NOT DRIVE while taking narcotic pain medications that have been prescribed by your physician.  If you had a limb operated on, you must be able to use it fully to drive.  4. DO NOT drink alcoholic beverages for 24 hours following surgery or while taking prescription pain medication.  5. Drink clear liquids (apple juice, ginger ale, broth, 7-Up, etc.).  Progress to your regular diet as you feel able.  6. Any questions call your physician and do not make important decisions for 24 hours.    ACTIVITY  ? Rest today.  No activity or diet restrictions.  ? Resume activity as tolerated.  ? Restrictions: per MD orders and anesthesia   ? See printed discharge sheet.     INCISIONAL CARE  ? Do not remove dressing until seen by physician.  ? Keep extremity elevated above the level of the heart if possible. .  ? Apply ice 1/2 hour on and 1/2 hour off while awake.     Call for an appointment to return to the clinic in 10-14 days.    Medications:  ? Acetaminophen & Oxycodone (Percocet):  Next dose: as needed but start tonight and take as scheduled till the block wears off and then as needed .  ? Hydroxyzine (Vistaril):  Next dose:  as needed but start tonight and take as scheduled till the block wears off and then as needed.  ? Ibuprofen (Motrin, Advil):  Next dose: as a supplement to the narcotics for pain control.  ? aspirin:  Next dose: August10th and then  daily.  ? Stool softener to prevent constipation   ? Follow the instructions on the bottle.     Additional discharge instructions: Follow anesthesia block instructions   __________________________________________________________________________________________________________________________________  IMPORTANT NUMBERS:    [facility] Main Number:  [phone], [phone]  Pharmacy:  [phone]  Same Day Surgery:  [phone], Monday - Friday until 8:30 p.m.  Urgent Care:  [phone]  Emergency Room:  [phone]                                                                            [facility]:  [phone] option 1  Home Medical Equipment: [phone]

## 2018-08-09 NOTE — ANESTHESIA POSTPROCEDURE EVALUATION
Patient: Joanie Coe    Procedure(s):  Right Ankle Arthroscopic Evaluation & Debridement &  Lateral Ligament Repair With Internal Augmentation &  Peroneal Tendon Repair & Great Toe Tendon Decompression - Wound Class: I-Clean    Diagnosis:Right ankle impingement and instability.  Diagnosis Additional Information: No value filed.    Anesthesia Type:  No value filed.    Note:  Anesthesia Post Evaluation    Patient location during evaluation: Bedside  Patient participation: Able to participate in evaluation but full recovery from regional anesthesia has not yet ocurrred but is anticipated to occur within 48 hours  Level of consciousness: awake and alert  Pain management: adequate  Airway patency: patent  Cardiovascular status: acceptable  Respiratory status: acceptable  Hydration status: acceptable  PONV: none     Anesthetic complications: None          Last vitals:  Vitals:    08/09/18 1159 08/09/18 1520 08/09/18 1530   BP: (!) 126/91 122/66 119/74   Resp: 16 16 16   Temp: 36.9  C (98.4  F) 36.4  C (97.6  F)    SpO2: 100% 100% 100%         Electronically Signed By: MARY LOU Hernández CRNA  August 9, 2018  3:48 PM

## 2018-08-09 NOTE — ANESTHESIA CARE TRANSFER NOTE
Patient: Joanie Coe    Procedure(s):  Right Ankle Arthroscopic Evaluation & Debridement &  Lateral Ligament Repair With Internal Augmentation &  Peroneal Tendon Repair & Great Toe Tendon Decompression - Wound Class: I-Clean    Diagnosis: Right ankle impingement and instability.  Diagnosis Additional Information: No value filed.    Anesthesia Type:   No value filed.     Note:  Airway :Nasal Cannula  Patient transferred to:PACU  Handoff Report: Identifed the Patient, Identified the Reponsible Provider, Reviewed the pertinent medical history, Discussed the surgical course, Reviewed Intra-OP anesthesia mangement and issues during anesthesia, Set expectations for post-procedure period and Allowed opportunity for questions and acknowledgement of understanding      Vitals: (Last set prior to Anesthesia Care Transfer)    CRNA VITALS  8/9/2018 1449 - 8/9/2018 1524      8/9/2018             Pulse: 90    SpO2: 100 %                Electronically Signed By: MARY LOU Hernández CRNA  August 9, 2018  3:24 PM

## 2018-08-09 NOTE — IP AVS SNAPSHOT
MRN:[id number]                      After Visit Summary   8/9/2018    Joanie Coe    MRN: [id number]           Thank you!     Thank you for choosing [facility] for your care. Our goal is always to provide you with excellent care. Hearing back from our patients is one way we can continue to improve our services. Please take a few minutes to complete the written survey that you may receive in the mail after you visit with us. Thank you!        Patient Information     Date Of Birth          1965        About your hospital stay     You were admitted on:  August 9, 2018 You last received care in the:  [facility] PreOP/Phase II    You were discharged on:  August 9, 2018       Who to Call     For medical emergencies, please call 911.  For non-urgent questions about your medical care, please call your primary care provider or clinic, None  For questions related to your surgery, please call your surgery clinic        Attending Provider     Provider Javy Bear DPM Podiatry       Primary Care Provider    Kalyani Edward MD      After Care Instructions     Discharge Instructions       Review discharge instructions as directed by Provider.            Discharge Instructions       Patient to be seen in next 10-14 days.    Please call [facility] at [phone] to make/confirm appointment.            Elevate affected extremity           Ice to affected area       Ice pack to affected area PRN (as needed).            No dressing change       until follow up clinic appointment.            No driving or operating machinery       until the day after procedure            No weight bearing                 Further instructions from your care team                           Same Day Surgery Discharge Instructions  Special Precautions After Surgery - Adult    1. It is not unusual to feel lightheaded or faint, up to 24 hours after surgery or while taking pain medication.   If you have these symptoms; sit for a few minutes before standing and have someone assist you when getting up.  2. You should rest and relax for the next 24 hours and must have someone stay with you for at least 24 hours after your discharge.  3. DO NOT DRIVE any vehicle or operate mechanical equipment for 24 hours following the end of your surgery.  DO NOT DRIVE while taking narcotic pain medications that have been prescribed by your physician.  If you had a limb operated on, you must be able to use it fully to drive.  4. DO NOT drink alcoholic beverages for 24 hours following surgery or while taking prescription pain medication.  5. Drink clear liquids (apple juice, ginger ale, broth, 7-Up, etc.).  Progress to your regular diet as you feel able.  6. Any questions call your physician and do not make important decisions for 24 hours.    ACTIVITY  ? Rest today.  No activity or diet restrictions.  ? Resume activity as tolerated.  ? Restrictions: per MD orders and anesthesia   ? See printed discharge sheet.     INCISIONAL CARE  ? Do not remove dressing until seen by physician.  ? Keep extremity elevated above the level of the heart if possible. .  ? Apply ice 1/2 hour on and 1/2 hour off while awake.     Call for an appointment to return to the clinic in 10-14 days.    Medications:  ? Acetaminophen & Oxycodone (Percocet):  Next dose: as needed but start tonight and take as scheduled till the block wears off and then as needed .  ? Hydroxyzine (Vistaril):  Next dose:  as needed but start tonight and take as scheduled till the block wears off and then as needed.  ? Ibuprofen (Motrin, Advil):  Next dose: as a supplement to the narcotics for pain control.  ? aspirin:  Next dose: August10th and then daily.  ? Stool softener to prevent constipation   ? Follow the instructions on the bottle.     Additional discharge instructions: Follow anesthesia block instructions  "  __________________________________________________________________________________________________________________________________  IMPORTANT NUMBERS:    [facility] [place]  [phone], [phone]  Pharmacy:  [phone]  Same Day Surgery:  [phone], Monday - Friday until 8:30 p.m.  Urgent Care:  [phone]  Emergency Room:  [phone] [place] [address] Sports and Orthopedics:  [phone] option 1  Home Medical Equipment: [phone]          Pending Results     No orders found from 8/7/2018 to 8/10/2018.            Admission Information     Date & Time Provider Department Dept. Phone    8/9/2018 Javy Gross DPM [facility] PreOP/Phase [phone]      Your Vitals Were     Blood Pressure Temperature Respirations Height Weight Pulse Oximetry    127/85 97.4  F (36.3  C) (Oral) 16 1.778 m (5' 10\") 74.4 kg (164 lb) 100%    BMI (Body Mass Index)                   23.53 kg/m2           MyChart Information     [facility] gives you secure access to your electronic health record. If you see a primary care provider, you can also send messages to your care team and make appointments. If you have questions, please call your primary care clinic.  If you do not have a primary care provider, please call [phone] and they will assist you.        Care EveryWhere ID     This is your Care EveryWhere ID. This could be used by other organizations to access your [facility] medical records  [id number]        Equal Access to Services     DIANA CACERES AH: Hadii aad ku hadasho Soomaali, waaxsameera alexandre waxay idiin hayaan adeeg kharash la'aan ah. So [facility] [phone].    ATENCIÓN: Si habla español, tiene a piña disposición servicios gratuitos de asistencia lingüística. Llame al [phone].    We comply with applicable federal civil rights laws and [place] laws. We do not discriminate on the basis of race, color, " national origin, age, disability, sex, sexual orientation, or gender identity.               Review of your medicines      START taking        Dose / Directions    aspirin 325 MG EC tablet   Used for:  Status post repair of ligament of ankle        Dose:  325 mg   Start taking on:  8/10/2018   Take 1 tablet (325 mg) by mouth daily   Quantity:  30 tablet   Refills:  0       hydrOXYzine 25 MG tablet   Commonly known as:  ATARAX   Used for:  Status post repair of ligament of ankle        Dose:  25 mg   Take 1 tablet (25 mg) by mouth every 6 hours as needed for itching (and nausea)   Quantity:  26 tablet   Refills:  0       oxyCODONE-acetaminophen 5-325 MG per tablet   Commonly known as:  PERCOCET   Used for:  Status post repair of ligament of ankle        Dose:  1-2 tablet   Take 1-2 tablets by mouth every 4 hours as needed for pain (moderate to severe)   Quantity:  26 tablet   Refills:  0         CONTINUE these medicines which have NOT CHANGED        Dose / Directions    BIOTIN   Used for:  Routine general medical examination at a health care facility        Refills:  0       CLARITIN 10 MG tablet   Used for:  Preventative health care   Generic drug:  loratadine        1 TABLET DAILY   Refills:  0       ESTROVEN PO        Refills:  0       FISH OIL PO        Refills:  0       METAMUCIL FREE & NATURAL PO        Refills:  0       PROBIOTIC DAILY PO        Refills:  0            Where to get your medicines      These medications were sent to [facility] [address] [place] [address] - [address]   [address] [facility] [address]     Phone:  [phone]     aspirin 325 MG EC tablet    hydrOXYzine 25 MG tablet         Some of these will need a paper prescription and others can be bought over the counter. Ask your nurse if you have questions.     Bring a paper prescription for each of these medications     oxyCODONE-acetaminophen 5-325 MG per tablet                Protect others around you: Learn how to safely  use, store and throw away your medicines at www.disposemymeds.org.        Information about OPIOIDS     PRESCRIPTION OPIOIDS: WHAT YOU NEED TO KNOW   We gave you an opioid (narcotic) pain medicine. It is important to manage your pain, but opioids are not always the best choice. You should first try all the other options your care team gave you. Take this medicine for as short a time (and as few doses) as possible.    Some activities can increase your pain, such as bandage changes or therapy sessions. It may help to take your pain medicine 30 to 60 minutes before these activities. Reduce your stress by getting enough sleep, working on hobbies you enjoy and practicing relaxation or meditation. Talk to your care team about ways to manage your pain beyond prescription opioids.    These medicines have risks:    DO NOT drive when on new or higher doses of pain medicine. These medicines can affect your alertness and reaction times, and you could be arrested for driving under the influence (DUI). If you need to use opioids long-term, talk to your care team about driving.    DO NOT operate heavy machinery    DO NOT do any other dangerous activities while taking these medicines.    DO NOT drink any alcohol while taking these medicines.     If the opioid prescribed includes acetaminophen, DO NOT take with any other medicines that contain acetaminophen. Read all labels carefully. Look for the word  acetaminophen  or  Tylenol.  Ask your pharmacist if you have questions or are unsure.    You can get addicted to pain medicines, especially if you have a history of addiction (chemical, alcohol or substance dependence). Talk to your care team about ways to reduce this risk.    All opioids tend to cause constipation. Drink plenty of water and eat foods that have a lot of fiber, such as fruits, vegetables, prune juice, apple juice and high-fiber cereal. Take a laxative (Miralax, milk of magnesia, Colace, Senna) if you don t move your  bowels at least every other day. Other side effects include upset stomach, sleepiness, dizziness, throwing up, tolerance (needing more of the medicine to have the same effect), physical dependence and slowed breathing.    Store your pills in a secure place, locked if possible. We will not replace any lost or stolen medicine. If you don t finish your medicine, please throw away (dispose) as directed by your pharmacist. The [place] [employer]ution [employer] has more information about safe disposal: https://www.pca.[facility].[place].us/living-green/managing-unwanted-medications             Medication List: This is a list of all your medications and when to take them. Check marks below indicate your daily home schedule. Keep this list as a reference.      Medications           Morning Afternoon Evening Bedtime As Needed    aspirin 325 MG EC tablet   Take 1 tablet (325 mg) by mouth daily   Start taking on:  8/10/2018                                BIOTIN                                CLARITIN 10 MG tablet   1 TABLET DAILY   Generic drug:  loratadine                                ESTROVEN PO                                FISH OIL PO                                hydrOXYzine 25 MG tablet   Commonly known as:  ATARAX   Take 1 tablet (25 mg) by mouth every 6 hours as needed for itching (and nausea)                                METAMUCIL FREE & NATURAL PO                                oxyCODONE-acetaminophen 5-325 MG per tablet   Commonly known as:  PERCOCET   Take 1-2 tablets by mouth every 4 hours as needed for pain (moderate to severe)                                PROBIOTIC DAILY PO

## 2018-08-09 NOTE — BRIEF OP NOTE
[facility] OR   [facility] Operative Note    Pre-operative diagnosis: Right ankle impingement and instability.   Post-operative diagnosis * No post-op diagnosis entered *   Procedure: Procedure(s):  Right ankle arthroscopic debridement, lateral ligament repair with internal augmentation, peroneal tendon repair. - Wound Class: I-Clean   Surgeon: Javy Gross DPM   Anesthesia: Combined General with Popliteal Block    Estimated blood loss: Less than 10 ml   Blood transfusion: No transfusion was given during surgery   Drains: None   Specimens: None   Findings:  Right ankle arthroscopic exam revealed significant impingement tissue secondary to ankle instability with acute and chronic inflammatory changes/synovitis.  Low-lying inferior tib-fib ligament/facets lesion was appreciated as well as gross mechanical ankle instability appreciated with dynamic ankle maneuvers viewable by arthroscopy.  Cavus foot architecture with insufficient attenuated compromised mechanically lateral supportive ankle collateral ligaments including both the anterior talofibular ligament and the calcaneal fibular ligament.  Peroneal tendons with tendon synovitic changes and peroneus brevis tendinopathy longitudinal splitting.   Complications: None   Condition: Stable   Comments: See dictated operative report for full details.           Javy Gross DPM, FACFAS  Foot & Ankle Surgeon/Specialist  [facility]

## 2018-08-09 NOTE — IP AVS SNAPSHOT
[facility] PreOP/Phase II    [address] [facility] [address]    Phone:  [phone]    Fax:  [phone]                                       After Visit Summary   8/9/2018    Joanie Coe    MRN: [id number]           After Visit Summary Signature Page     I have received my discharge instructions, and my questions have been answered. I have discussed any challenges I see with this plan with the nurse or doctor.    ..........................................................................................................................................  Patient/Patient Representative Signature      ..........................................................................................................................................  Patient Representative Print Name and Relationship to Patient    ..................................................               ................................................  Date                                            Time    ..........................................................................................................................................  Reviewed by Signature/Title    ...................................................              ..............................................  Date                                                            Time

## 2018-08-09 NOTE — OP NOTE
[facility] ORTHOPEDICS OPERATIVE REPORT  Operative Report - Orthopedics  Joanie Coe,  1965, MRN [id number]    Surgery Date: 18    PCP: Kalyani Edward, None   Code status:  No Order       OPERATION SITE:  [facility] Operating Room       OPERATIVE REPORT  DR. JAVY ABEL  FOOT & ANKLE SURGEON  [facility] ORTHOPEDICS    DATE OF PROCEDURE: 18    SITE: [facility] Operating Room    SURGEON: Dr. Javy Abel - [facility]    ASSISTANT: RICKY Corona      Pre-Operative Diagnosis:  1.  Right-sided ankle impingement, instability associated  2.  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  Right peroneal tendinopathy brevis and longus, secondary to mechanical overload and attenuation  4.  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  Right-sided ankle impingement, instability associated  2.  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  Right peroneal tendinopathy brevis and longus, secondary to mechanical overload and attenuation  4.  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  Right ankle arthroscopic evaluation and debridement, extensive  2.  Right lateral ankle bilateral collateral ligamentous repair with internal augmentation  3.  Right peroneal/flexor tendon repair with debridement and side-to-side tenodesis along with tenosynovectomy of the tendon sheath  4.  Right peroneal tendon retinacular repair of tissue and inspection of fibular groove  5.  Posterior splint application below the knee ankle neutral, fiberglass    Anesthesia: General anesthesia with popliteal peripheral blockade performed by anesthesia services under ultrasonic guidance  Hemostasis: Right sided thigh tourniquet at 300 mmHg for the duration of the procedure  EBL: < 10 mL  Findings:  Intra-operative arthroscopic findings of the ankle revealed significant impingement lesions and dynamic ankle instability mechanically with maneuvers including anterior drawer and inversion examinations.  Acute chronic synovitis along with lateral associated impingement lesions including a low-lying inferior tib-fib ligament.  With cavus architecture significant attenuation and compromise lateral collateral supporting ankle ligaments along with peroneal sheath tenosynovectomy, subluxation/retinacular insufficiency along with splitting and fraying with tendinopathy of both the peroneal brevis and longus tendons secondary to mechanical overload with time.  Implants: To complete the lateral collateral ligament repair with internal augmentation Arthrex internal brace system utilized with bio composite swivel lock anchors from the talus to the fibula along with 2 suture tack anchors to oversew and imbricate modified Broström Bridges fashion the extensor retinaculum the native anterior talofibular ligament and the calcaneal fibular ligament.  Specimens: None    Indications for the Operation:  The patient has been seen and evaluated in clinic for the above-mentioned diagnoses.  They have failed to respond to nonoperative care measures and/or surgical care for condition was indicated.  They have elected to proceed as recommended/indicated with surgical care after a thorough discussion of the associated pros, cons, risks and benefits of the operations as well as the postoperative course and details.  All associated questions were answered.  Verbal and written form informed consent was obtained.  Please see additional information within the clinical notes.    Description of the Procedure:  Patient was seen and evaluated in the preoperative holding area.  The surgical site was marked.  The consent was signed.  The H&P was updated/reviewed.  Patient was transported from the preoperative holding area to the surgical suite.  The  patient was placed on the operative table.  Anesthesia was obtained.  Antibiotics were administered via IV.  Tourniquet was applied.  The operative extremity was prepped and draped sterilely.  Then, a timeout was performed to identify the proper patient, surgical site and the procedures to be performed.  Local anesthetic was infiltrated about the operative margins for regional blockade utilizing a one-to-one mixture of 2% lidocaine plain and 0.5% Marcaine plain approximately 20 cc of the mixture was utilized.  The foot/ankle was exsanguinated, and the tourniquet was inflated.    Attention was then directed to the right ankle.  A noninvasive ankle joint distractor was applied after the ankle joint was insufflated with 15 cc of sterile injectable saline.  The anterior medial and lateral anterior portals were established via safe zones.  These were then utilized interchangeably as both viewing and working portals to complete the anterior ankle evaluation and decompression.  Acute chronic synovitis was appreciated along with dynamic mechanical ankle instability appreciated with anterior drawer and lateral inversion examinations.  Acute chronic synovitis was debrided with a full-radius 3.5 resector.  Due to the gross instability significant viewing the ankle was available posterior lateral impingement was also debrided.  Cartilaginous surfaces were intact low-lying inferior tib-fib ligament was appreciated and debrided to improve dorsiflexor E ability.  Pre-and post arthroscopic images were obtained and saved to the system.  Thorough lavage the ankle joint was conducted and a 3-0 nylon figure-of-eight portal stitch was utilized to reapproximate these portals primarily.  Then, the foot/ankle correction noninvasive distractor were removed.  The distal curvilinear incision made from posterior fibula extending anteriorly to address both the peroneal issues, the peroneal retinaculum issues and lateral collateral ankle  ligament tissues in the setting of cavus foot architecture with mechanical ankle instability.  This incision was deepened in layers was approximately 10 cm in linear length.  Careful neurovascular edification and retraction was conducted here.  The peroneal tendon sheath was opened up found to be of gross collective reactive tenosynovitis.  This was debrided via micro-tenotomy procedures.  The tendons themselves were found to be with tendinopathy, fraying and splitting but this was debrided the re-tubularized individually and collectively attached with a side-to-side tenodesis brevis to longus with interlocking running 2-0 FiberWire stitch after these were prepared.  The peroneal retinaculum soft tissues found to be compromised peroneal groove did not need to be deepened.  This was repaired with a 2-0 FiberWire imbricating it back to its native calcaneal attachment to secure the peroneal tendons posterior and inferior to the fibula.  Then the lateral ankle collateral ligaments were dissected off of the fibula there found to be thickened and grossly attenuated the diseased section insertional he was debrided off.  The distal fibular anterior and inferior margins was partially decorticated with a rasp and a Lenny.  Suture tacks were then placed in the native margin of the ATF and CF ligament footprint on the fibula there is an oversewn into the native ligaments as well as incorporating the extensor retinaculum tied back into place with the foot in maximum eversion.  Then drill holes were placed for the 4.5 swivel lock anchor from the talus back to the fibula and under physiologic tension and augment internal brace was applied from the talus to the fibula with end range restriction provided.  The remaining sections of suture from the suture tacks was oversewn to complete the muscle Broström Bridges with extensor retinaculum and internal augmentation is significantly improved on the operative table the mechanical ankle  instability with smooth maintenance and motion of the ankle with dynamic range of motion examinations.    Following this, thorough irrigation of the surgical sites was conducted.  Layered, anatomic closure completed with 2-0 Vicryl, 3-0 Vicryl and 3-0 nylon with careful apposition of the skin and surfaces for primary healing.  A compressive sterile splint/dressing was applied.  Vascular status was intact after deflation of the tourniquet.  SPLINT: A posterior fiberglass, below the knee splint was applied with the ankle in the neutral position.  COMPLICATIONS: No direct complications encountered throughout the case.    The patient tolerated the procedure & anesthesia well.  They were transported from the operative suite to the postoperative holding area.  The patient was given postoperative orders as well as specific postoperative instructions which were reviewed by the nursing staff.  Orders were placed for weightbearing status/activity, postoperative oral pain management, DVT prophylaxis measures both with mechanical and medicinal measures reviewed.  Splint/dressing care measures were reviewed as well as appropriate cryotherapy measures and nutrition.  Postoperative follow-up to be conducted in the next 10-14 days for outpatient clinical follow-up in the Orthopedic clinic at [facility].  If concerns or questions arise or develop they will contact our clinic and postoperative contact numbers provided.  Case details and post-operative care requirements reviewed with family/support present today.  Additionally, a detailed postoperative instruction sheet was provided to the patient and family.  All additional questions were answered postoperatively.    Please note that this report was completed with the assistance of voice recognition and transcription services.  Although every effort has been made to correct and avoid errors, errors may remain.    Dr. Javy Gross, LON, FACFAS  Foot & Ankle  Surgeon/Specialist  [facility]          CC: [facility], Dr. Gross's Clinical Team

## 2018-08-09 NOTE — ANESTHESIA POSTPROCEDURE EVALUATION
Patient: Joanie Coe    Procedure(s):  Right Ankle Arthroscopic Evaluation & Debridement &  Lateral Ligament Repair With Internal Augmentation &  Peroneal Tendon Repair & Great Toe Tendon Decompression - Wound Class: I-Clean    Diagnosis:Right ankle impingement and instability.  Diagnosis Additional Information: No value filed.    Anesthesia Type:  No value filed.    Note:  Anesthesia Post Evaluation    Patient location during evaluation: Bedside  Patient participation: Able to participate in evaluation but full recovery from regional anesthesia has not yet ocurrred but is anticipated to occur within 48 hours  Level of consciousness: awake and alert  Pain management: adequate  Airway patency: patent  Cardiovascular status: acceptable  Respiratory status: acceptable  Hydration status: acceptable  PONV: none     Anesthetic complications: None          Last vitals:  Vitals:    08/09/18 1545 08/09/18 1553 08/09/18 1600   BP: 129/78 123/76 127/85   Resp: 16 16 16   Temp:   36.3  C (97.4  F)   SpO2: 100% 100% 100%         Electronically Signed By: MARY LOU Jewell CRNA  August 9, 2018  4:44 PM

## 2018-10-03 ENCOUNTER — HOSPITAL ENCOUNTER (OUTPATIENT)
Dept: PHYSICAL THERAPY | Facility: CLINIC | Age: 53
Setting detail: THERAPIES SERIES
End: 2018-10-03
Attending: PODIATRIST
Payer: COMMERCIAL

## 2018-10-03 PROCEDURE — 97161 PT EVAL LOW COMPLEX 20 MIN: CPT | Mod: GP | Performed by: PHYSICAL THERAPIST

## 2018-10-03 PROCEDURE — 97110 THERAPEUTIC EXERCISES: CPT | Mod: GP | Performed by: PHYSICAL THERAPIST

## 2018-10-03 PROCEDURE — 40000718 ZZHC STATISTIC PT DEPARTMENT ORTHO VISIT: Performed by: PHYSICAL THERAPIST

## 2018-10-03 NOTE — PROGRESS NOTES
10/03/18 1600   General Information   Type of Visit Initial OP Ortho PT Evaluation   Start of Care Date 10/03/18   Referring Physician Javy Gross   Patient/Family Goals Statement To get back to my old self   Orders Evaluate and Treat   Date of Order 09/20/18   Insurance Type ([employer])   Medical Diagnosis s/p R ankle lateral ligament repair   Body Part(s)   Body Part(s) Ankle/Foot   Presentation and Etiology   Pertinent history of current problem (include personal factors and/or comorbidities that impact the POC) Pt is s/p R lateral ligament repair on 8/9/18 ( see surgical note in chart).  Pt notes she has a high arch and feels it was a cumulative injury.    Pt was NWB X 4 weeks then progressive wt bearing.  Pt  d/c cam boot 10/2/18 as it was also bothering hip / back.   Pt notes ongoing swelling.  Pt notes constant tingling top of foot and lateral ankle.  Pt notes intermittent pain 3/10.    Meds: advil prn.   PMHX  unremarkable.  Mod:  activity level   Impairments C. Swelling;E. Decreased flexibility;F. Decreased strength and endurance;H. Impaired gait;K. Numbness;L. Tingling   Onset date of current episode/exacerbation 08/09/18   Pain quality A. Sharp;B. Dull;C. Aching   Pain exacerbation comment walking > 3 min, cont to use gait aide.  Stairs marked time.  Pt drove for 1st time today.  Not walking or exercising.  Wakes 1X/ night then notes difficulty getting back to sleep.   Pain/symptoms eased by F. Certain positions;H. Cold  (elevating the leg)   Progression of symptoms since onset: Improved   Prior Level of Function   Functional Level Prior Comment Walks daily 3-5 miles,  rock climbs,  exercises.   Current Level of Function   Patient role/employment history A. 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   Fall Risk Screen   Fall screen completed by PT   Have you fallen 2 or more times in the past year? No   Have you fallen and had an injury in the past year? No   Is patient a fall  risk? No   Ankle/Foot Objective Findings   Side (if bilateral, select both right and left) Right   Observation Pt wearing compression sock   Incision healing as expected 1 small scab noted.     Integumentary Moderate swelling lateral malleoli   Gait/Locomotion Mild limp w/ 1 crutch slower pace.   Ankle/Foot Strength Comments MMT B hip ext 4-/5,  hip abd R 4/5,  L 5/5   Right DF (Knee Ext) AROM R 5* away from 0*, L 7*   Right PF AROM R 17*,  L 60*   Right Calcanceal Inversion AROM R 7*, L 35*   Right Calcaneal Eversion AROM R 0*,  L 10*   Planned Therapy Interventions   Planned Therapy Interventions manual therapy;ROM;strengthening;stretching;neuromuscular re-education   Clinical Impression   Criteria for Skilled Therapeutic Interventions Met yes, treatment indicated   PT Diagnosis R ankle pain /swelling following lateral ligament repair   Influenced by the following impairments pain, swelling, decreased ROM   Functional limitations due to impairments walking, stairs, ankle mobility, sleeping, exercise   Clinical Presentation Stable/Uncomplicated   Clinical Presentation Rationale progressing as expected following surgery   Clinical Decision Making (Complexity) Low complexity   Therapy Frequency 1 time/week   Predicted Duration of Therapy Intervention (days/wks) 8 weeks   Risk & Benefits of therapy have been explained Yes   Patient, Family & other staff in agreement with plan of care Yes   Education Assessment   Barriers to Learning No barriers   Ortho Goal 1   Goal Description 1.  Pt will be able to walk w/o device w/ slight to no limp   Target Date 11/12/18   Ortho Goal 2   Goal Description 2.  Pt will be able to do stairs reciprocally w/ 1 rail w/ minimal difficulty   Target Date 12/02/18   Ortho Goal 3   Goal Description 3.  Pt will wake no > 3X/wk due to ankle   Target Date 12/02/18   Ortho Goal 4   Goal Description 4.  Pt will be able to return to walking for ex (up to 1 mile) w/ pain no > 2/10   Target Date  12/02/18   Ortho Goal 5   Goal Description 5.  Pt will be independent and consistent w/HEP   Target Date 12/02/18   Total Evaluation Time   Total Evaluation Time 25     Thank you for this referral,    Jo Ann No, PT,  CEAS   #[id number]  [facility]  [phone]

## 2018-10-08 ENCOUNTER — HOSPITAL ENCOUNTER (OUTPATIENT)
Dept: PHYSICAL THERAPY | Facility: CLINIC | Age: 53
Setting detail: THERAPIES SERIES
End: 2018-10-08
Attending: PODIATRIST
Payer: COMMERCIAL

## 2018-10-08 PROCEDURE — 97110 THERAPEUTIC EXERCISES: CPT | Mod: GP | Performed by: PHYSICAL THERAPIST

## 2018-10-08 PROCEDURE — 40000718 ZZHC STATISTIC PT DEPARTMENT ORTHO VISIT: Performed by: PHYSICAL THERAPIST

## 2018-10-17 ENCOUNTER — HOSPITAL ENCOUNTER (OUTPATIENT)
Dept: PHYSICAL THERAPY | Facility: CLINIC | Age: 53
Setting detail: THERAPIES SERIES
End: 2018-10-17
Attending: PODIATRIST
Payer: COMMERCIAL

## 2018-10-17 PROCEDURE — 97110 THERAPEUTIC EXERCISES: CPT | Mod: GP | Performed by: PHYSICAL THERAPIST

## 2018-10-17 PROCEDURE — 40000718 ZZHC STATISTIC PT DEPARTMENT ORTHO VISIT: Performed by: PHYSICAL THERAPIST

## 2018-10-24 ENCOUNTER — HOSPITAL ENCOUNTER (OUTPATIENT)
Dept: PHYSICAL THERAPY | Facility: CLINIC | Age: 53
Setting detail: THERAPIES SERIES
End: 2018-10-24
Attending: PODIATRIST
Payer: COMMERCIAL

## 2018-10-24 PROCEDURE — 97110 THERAPEUTIC EXERCISES: CPT | Mod: GP | Performed by: PHYSICAL THERAPIST

## 2018-10-24 PROCEDURE — 40000718 ZZHC STATISTIC PT DEPARTMENT ORTHO VISIT: Performed by: PHYSICAL THERAPIST

## 2018-10-31 ENCOUNTER — HOSPITAL ENCOUNTER (OUTPATIENT)
Dept: PHYSICAL THERAPY | Facility: CLINIC | Age: 53
Setting detail: THERAPIES SERIES
End: 2018-10-31
Attending: PODIATRIST
Payer: COMMERCIAL

## 2018-10-31 PROCEDURE — 40000718 ZZHC STATISTIC PT DEPARTMENT ORTHO VISIT: Performed by: PHYSICAL THERAPIST

## 2018-10-31 PROCEDURE — 97140 MANUAL THERAPY 1/> REGIONS: CPT | Mod: GP | Performed by: PHYSICAL THERAPIST

## 2018-10-31 PROCEDURE — 97110 THERAPEUTIC EXERCISES: CPT | Mod: GP | Performed by: PHYSICAL THERAPIST

## 2018-11-07 ENCOUNTER — HOSPITAL ENCOUNTER (OUTPATIENT)
Dept: PHYSICAL THERAPY | Facility: CLINIC | Age: 53
Setting detail: THERAPIES SERIES
End: 2018-11-07
Attending: PODIATRIST
Payer: COMMERCIAL

## 2018-11-07 PROCEDURE — 97110 THERAPEUTIC EXERCISES: CPT | Mod: GP | Performed by: PHYSICAL THERAPIST

## 2018-11-07 PROCEDURE — 97140 MANUAL THERAPY 1/> REGIONS: CPT | Mod: GP | Performed by: PHYSICAL THERAPIST

## 2018-11-07 PROCEDURE — 40000718 ZZHC STATISTIC PT DEPARTMENT ORTHO VISIT: Performed by: PHYSICAL THERAPIST

## 2018-11-14 ENCOUNTER — HOSPITAL ENCOUNTER (OUTPATIENT)
Dept: PHYSICAL THERAPY | Facility: CLINIC | Age: 53
Setting detail: THERAPIES SERIES
End: 2018-11-14
Attending: PODIATRIST
Payer: COMMERCIAL

## 2018-11-14 PROCEDURE — 97110 THERAPEUTIC EXERCISES: CPT | Mod: GP | Performed by: PHYSICAL THERAPIST

## 2018-11-14 PROCEDURE — 40000718 ZZHC STATISTIC PT DEPARTMENT ORTHO VISIT: Performed by: PHYSICAL THERAPIST

## 2018-11-14 PROCEDURE — 97140 MANUAL THERAPY 1/> REGIONS: CPT | Mod: GP | Performed by: PHYSICAL THERAPIST

## 2018-11-20 ENCOUNTER — HOSPITAL ENCOUNTER (OUTPATIENT)
Dept: PHYSICAL THERAPY | Facility: CLINIC | Age: 53
Setting detail: THERAPIES SERIES
End: 2018-11-20
Attending: PODIATRIST
Payer: COMMERCIAL

## 2018-11-20 PROCEDURE — 97110 THERAPEUTIC EXERCISES: CPT | Mod: GP | Performed by: PHYSICAL THERAPIST

## 2018-11-20 PROCEDURE — 40000718 ZZHC STATISTIC PT DEPARTMENT ORTHO VISIT: Performed by: PHYSICAL THERAPIST

## 2018-11-20 PROCEDURE — 97140 MANUAL THERAPY 1/> REGIONS: CPT | Mod: GP | Performed by: PHYSICAL THERAPIST

## 2018-11-20 NOTE — PROGRESS NOTES
"   OUTPATIENT PHYSICAL THERAPY PROGRESS NOTE   Javy Ginny 10/3/18 to 11/20/18 0800   Signing Clinician's Name / Credentials   Signing clinician's name / credentials Jo Ann No, PT [id number]   Session Number   Session Number 8 [employer]   Ortho Goal 1   Goal Description 1.  Pt will be able to walk w/o device w/ slight to no limp.  11/20/18 slight limp / slower pace/ decreased arm swing   MET   Target Date 11/12/18   Ortho Goal 2   Goal Description 2.  Pt will be able to do stairs reciprocally w/ 1 rail w/ minimal difficulty.  11/20/18 up reciprocal w/ mild to mod difficulty;  down marked time   Target Date 01/04/19   Ortho Goal 3   Goal Description 3.  Pt will wake no > 3X/wk due to ankle.  11/20/18 not waking at night but cont to wear sleeve  MET   Target Date 12/02/18   Ortho Goal 4   Goal Description 4.  Pt will be able to return to walking for ex (up to 1 mile) w/ pain no > 2/10   Target Date 01/04/19   Ortho Goal 5   Goal Description 5.  Pt will be independent and consistent w/HEP.  11/20/18 consistent w/ current program MET   Target Date 12/02/18   Subjective Report   Subjective Report Pt cont to note tightness in anterior ankle.  Pt notes decreased pressure in foot w/ swelling.  No significant pain primarily tightness/ stiffness   Objective Measures   Objective Measure slight limp / slower pace/ decreased arm swing   Details AROM  R ankle DF 0*,  PF 40*, inversion 25*,  eversion 10*   Therapeutic Procedure/exercise   Patient Response RTB inv/ever, Heelraises, partial squats and anterior ankle stretch on own   Treatment Detail Scifit seat 10 L 3 X 3 min. Blue rocker board DF/ PF X 12.  2 foot balance on blue rocker board X 1 min .  tandem stand 30\" X 2 ( 5 touches/ 0 touches R foot back),  L back X 30 \"    Incline board gastroc stretch X 30.  6\" step up X 15.  2\" step down X 10 w/ 1 hand support (noted some ankle irritation).      AAROM w/ PT and gastroc stretch.     Manual Therapy   Treatment Detail ST work to " ankle/ lower leg and lateral scar.   Plan   Home program ex as above, ice   Plan cont 1X/7-10 days upto 4 additional visits.  Progress balance/ CKC ex   Comments   Comments Progress towards goals as noted above

## 2018-11-30 ENCOUNTER — HOSPITAL ENCOUNTER (OUTPATIENT)
Dept: PHYSICAL THERAPY | Facility: CLINIC | Age: 53
Setting detail: THERAPIES SERIES
End: 2018-11-30
Attending: PODIATRIST
Payer: COMMERCIAL

## 2018-11-30 PROCEDURE — 40000718 ZZHC STATISTIC PT DEPARTMENT ORTHO VISIT: Performed by: PHYSICAL THERAPIST

## 2018-11-30 PROCEDURE — 97110 THERAPEUTIC EXERCISES: CPT | Mod: GP | Performed by: PHYSICAL THERAPIST

## 2018-11-30 PROCEDURE — 97140 MANUAL THERAPY 1/> REGIONS: CPT | Mod: GP | Performed by: PHYSICAL THERAPIST

## 2018-12-18 ENCOUNTER — HOSPITAL ENCOUNTER (OUTPATIENT)
Dept: PHYSICAL THERAPY | Facility: CLINIC | Age: 53
Setting detail: THERAPIES SERIES
End: 2018-12-18
Attending: PODIATRIST
Payer: COMMERCIAL

## 2018-12-18 PROCEDURE — 97110 THERAPEUTIC EXERCISES: CPT | Mod: GP | Performed by: PHYSICAL THERAPIST

## 2018-12-18 PROCEDURE — 97140 MANUAL THERAPY 1/> REGIONS: CPT | Mod: GP | Performed by: PHYSICAL THERAPIST

## 2019-01-30 NOTE — ADDENDUM NOTE
Encounter addended by: Jo Ann No, PT on: 1/30/2019 1:13 PM   Actions taken: Sign clinical note, Flowsheet accepted, Episode resolved

## 2019-01-30 NOTE — PROGRESS NOTES
"   OUTPATIENT PHYSICAL THERAPY DISCHARGE SUMMARY   Javy Gross 10/3/18 to 12/18/18 0800   Signing Clinician's Name / Credentials   Signing clinician's name / credentials Jo Ann No, PT [id number]   Session Number   Session Number 10 [employer]   Ortho Goal 1   Goal Description 1.  Pt will be able to walk w/o device w/ slight to no limp.  11/20/18 slight limp / slower pace/ decreased arm swing   MET   Target Date 11/12/18   Ortho Goal 2   Goal Description 2.  Pt will be able to do stairs reciprocally w/ 1 rail w/ minimal difficulty.  11/20/18 up reciprocal w/ mild to mod difficulty;  down marked time.  12/18/18 reciprocal up and down no problem unless she does stairs repetitively  MET   Target Date 01/04/19   Ortho Goal 3   Goal Description 3.  Pt will wake no > 3X/wk due to ankle.  11/20/18 not waking at night but cont to wear sleeve  MET   Target Date 12/02/18   Ortho Goal 4   Goal Description 4.  Pt will be able to return to walking for ex (up to 1 mile) w/ pain no > 2/10.  12/18/18 just starting in the past week   Target Date 01/04/19   Ortho Goal 5   Goal Description 5.  Pt will be independent and consistent w/HEP.  11/20/18 consistent w/ current program MET   Target Date 12/02/18   Subjective Report   Subjective Report Pt cont to note some stiffness.  Pt got orthotics yesterday which made the foot feel better.  Pain infrequent --primarily if steps wrong or if she walks too long (30 min).   Objective Measures   Objective Measure slight limp/ slightly slower pace/ improving stride length   Details AROM  R ankle DF 0*,  PF 45*, inversion 25*,  eversion 7*   Objective Measure MMT R ankle DF 5/5, inversion 5-5,  eversion 4+/5   Therapeutic Procedure/exercise   Patient Response RTB inv/ever, Heelraises, partial squats and anterior ankle stretch on own    Treatment Detail  Standing Blue rocker board DF/ PF X 10.  2 foot balance on blue rocker board X 1 min ( 4 touch) .  tandem stand 30\"  ( 0 touch R foot back).  SLS 30\" " "X 2  ( 3 , 2 touches).      gastroc stretch X 30\".  7\" step up X 20 (no longer needs to do at home).  4\" step down X 20 w/ 1 hand support.      AAROM w/ PT and gastroc stretch.  Pt instructed to work up to 30 min walk regularly for exercise.   Manual Therapy   Treatment Detail Grade II AP talocrural mobes.  ST work to ankle/ lower leg and lateral scar.   Plan   Home program ex as above, ice   Plan Pt to cont on own w/ HEP.  Discharge from physical therapy.   Comments   Comments Pt has met goals 1-3, 5     "

## 2019-08-23 NOTE — PROGRESS NOTES
SUBJECTIVE:   CC: Joanie Coe is an 54 year old woman who presents for preventive health visit.     Healthy Habits:    Answers for HPI/ROS submitted by the patient on 9/5/2019   Annual Exam:  Frequency of exercise:: 4-5 days/week  Getting at least 3 servings of Calcium per day:: Yes  Diet:: Regular (no restrictions)  Taking medications regularly:: Yes  Medication side effects:: None  Bi-annual eye exam:: NO  Dental care twice a year:: Yes  Sleep apnea or symptoms of sleep apnea:: None  abdominal pain: No  Blood in stool: No  Blood in urine: No  chest pain: No  chills: No  congestion: No  constipation: No  cough: No  diarrhea: No  dizziness: No  ear pain: No  eye pain: No  nervous/anxious: No  fever: No  frequency: No  genital sores: No  headaches: No  hearing loss: No  heartburn: No  arthralgias: No  joint swelling: No  peripheral edema: No  mood changes: No  myalgias: No  nausea: No  dysuria: No  palpitations: No  Skin sensation changes: No  sore throat: No  urgency: No  shortness of breath: No  visual disturbance: Yes  weakness: No  pelvic pain: No  vaginal bleeding: Yes  Additional concerns today:: No  Duration of exercise:: 30-45 minutes    Up to date on colonoscopy.   Due for fasting labs.     Had leep and endometrial biopsy last year that was normal through OBGYn.  Was spotting at this time. She had cervical polyps at that time.    She still has her period. Has not gone a year without her period. Has gone 6 months however.  Last period was 7-26. Before that was in December.     Gets hot flashes.  Has been taking estroven (black cohosh plus soy) OTC. Wants to know if this is okay to take. I will message obgyn and get back to her.         Was told due for pap in 3 years.     Mammogram-is due.       Likes to mountain bike.               Today's PHQ-2 Score:   PHQ-2 ( 1999 Pfizer) 9/5/2019 11/20/2017   Q1: Little interest or pleasure in doing things 0 0   Q2: Feeling down, depressed or hopeless 0 0   PHQ-2 Score  0 0   Q1: Little interest or pleasure in doing things Not at all -   Q2: Feeling down, depressed or hopeless Not at all -   PHQ-2 Score 0 -       Abuse: Current or Past(Physical, Sexual or Emotional)- No  Do you feel safe in your environment? Yes    Social History     Tobacco Use     Smoking status: Never Smoker     Smokeless tobacco: Never Used   Substance Use Topics     Alcohol use: Yes     Comment: occ     If you drink alcohol do you typically have >3 drinks per day or >7 drinks per week? No                     Reviewed orders with patient.  Reviewed health maintenance and updated orders accordingly - Yes  Lab work is in process  Labs reviewed in EPIC  BP Readings from Last 3 Encounters:   09/05/19 131/82   08/09/18 125/68   07/23/18 114/72    Wt Readings from Last 3 Encounters:   09/05/19 74.8 kg (165 lb)   08/09/18 74.4 kg (164 lb)   07/23/18 74.7 kg (164 lb 9.6 oz)                  Patient Active Problem List   Diagnosis     CARDIOVASCULAR SCREENING; LDL GOAL LESS THAN 160     Cervical polyp     Advanced directives, counseling/discussion     Past Surgical History:   Procedure Laterality Date     ARTHROSCOPY ANKLE, OPEN REPAIR LIGAMENT, COMBINED Right 8/9/2018    Procedure: COMBINED ARTHROSCOPY ANKLE, OPEN REPAIR LIGAMENT;  Right Ankle Arthroscopic Evaluation & Debridement &  Lateral Ligament Repair With Internal Augmentation &  Peroneal Tendon Repair & Great Toe Tendon Decompression;  Surgeon: Javy Gross DPM;  Location: [facility]     COLONOSCOPY  3/28/2014    Procedure: COLONOSCOPY;  Colonoscopy;  Surgeon: Everett Gray MD;  Location: [facility]     HC TOOTH EXTRACTION W/FORCEP      root canal     PELVIS LAPAROSCOPY,DX         Social History     Tobacco Use     Smoking status: Never Smoker     Smokeless tobacco: Never Used   Substance Use Topics     Alcohol use: Yes     Comment: occ     Family History   Problem Relation Age of Onset     Diabetes Father      Hypertension Father      Lipids Father       Cerebrovascular Disease Father         Late 70s     Thyroid Disease Sister          Current Outpatient Medications   Medication Sig Dispense Refill     aspirin 325 MG EC tablet Take 1 tablet (325 mg) by mouth daily 30 tablet 0     BIOTIN        CLARITIN 10 MG OR TABS 1 TABLET DAILY       hydrOXYzine (ATARAX) 25 MG tablet Take 1 tablet (25 mg) by mouth every 6 hours as needed for itching (and nausea) 26 tablet 0     Nutritional Supplements (ESTROVEN PO)        Omega-3 Fatty Acids (FISH OIL PO)        oxyCODONE-acetaminophen (PERCOCET) 5-325 MG per tablet Take 1-2 tablets by mouth every 4 hours as needed for pain (moderate to severe) 26 tablet 0     Probiotic Product (PROBIOTIC DAILY PO)        Psyllium (METAMUCIL FREE & NATURAL PO)        Allergies   Allergen Reactions     Nka [No Known Allergies]        Mammogram Screening: Patient over age 50, mutual decision to screen reflected in health maintenance.    Pertinent mammograms are reviewed under the imaging tab.  History of abnormal Pap smear: NO - age 30- 65 PAP every 3 years recommended  PAP / HPV Latest Ref Rng & Units 11/20/2017 6/4/2013 5/19/2008   PAP - OTHER-NIL, See Result NIL NIL   HPV 16 DNA NEG:Negative Negative - -   HPV 18 DNA NEG:Negative Negative - -   OTHER HR HPV NEG:Negative Negative - -     Reviewed and updated as needed this visit by clinical staff  Tobacco  Allergies  Meds  Med Hx  Surg Hx  Fam Hx  Soc Hx        Reviewed and updated as needed this visit by Provider        Past Medical History:   Diagnosis Date     NO ACTIVE PROBLEMS       Past Surgical History:   Procedure Laterality Date     ARTHROSCOPY ANKLE, OPEN REPAIR LIGAMENT, COMBINED Right 8/9/2018    Procedure: COMBINED ARTHROSCOPY ANKLE, OPEN REPAIR LIGAMENT;  Right Ankle Arthroscopic Evaluation & Debridement &  Lateral Ligament Repair With Internal Augmentation &  Peroneal Tendon Repair & Great Toe Tendon Decompression;  Surgeon: Javy Gross DPM;  Location: [facility]      "COLONOSCOPY  3/28/2014    Procedure: COLONOSCOPY;  Colonoscopy;  Surgeon: Everett Gray MD;  Location: [facility]     HC TOOTH EXTRACTION W/FORCEP      root canal     PELVIS LAPAROSCOPY,DX       OB History    Para Term  AB Living   2 1 0 0 0 2   SAB TAB Ectopic Multiple Live Births   0 0 0 1 0      # Outcome Date GA Lbr Goyo/2nd Weight Sex Delivery Anes PTL Lv   2             1 Para  35w0d             Birth Comments: twin pregnancy           OBJECTIVE:   /82   Pulse 55   Temp 98.1  F (36.7  C) (Oral)   Resp 16   Ht 1.778 m (5' 10\")   Wt 74.8 kg (165 lb)   SpO2 99%   Breastfeeding? No   BMI 23.68 kg/m    EXAM:  GENERAL: healthy, alert and no distress  EYES: Eyes grossly normal to inspection, PERRL and conjunctivae and sclerae normal  HENT: ear canals and TM's normal, nose and mouth without ulcers or lesions  NECK: no adenopathy, no asymmetry, masses, or scars and thyroid normal to palpation  RESP: lungs clear to auscultation - no rales, rhonchi or wheezes  BREAST: normal without masses, tenderness or nipple discharge and no palpable axillary masses or adenopathy  CV: regular rate and rhythm, normal S1 S2, no S3 or S4, no murmur, click or rub, no peripheral edema and peripheral pulses strong  ABDOMEN: soft, nontender, no hepatosplenomegaly, no masses and bowel sounds normal  MS: no gross musculoskeletal defects noted, no edema  SKIN: no suspicious lesions or rashes  NEURO: Normal strength and tone, mentation intact and speech normal  PSYCH: mentation appears normal, affect normal/bright    Diagnostic Test Results:  Labs reviewed in Epic    ASSESSMENT/PLAN:   1. Routine general medical examination at a health care facility    - Lipid panel reflex to direct LDL Fasting  - Comprehensive metabolic panel  - *MA Screening Digital Bilateral; Future    2. Advanced directives, counseling/discussion      3. Menopausal syndrome (hot flashes)  I spoke with OBGYN and messaged patient, ok " "to continue black cohosh      COUNSELING:   Reviewed preventive health counseling, as reflected in patient instructions       Regular exercise    Estimated body mass index is 23.68 kg/m  as calculated from the following:    Height as of this encounter: 1.778 m (5' 10\").    Weight as of this encounter: 74.8 kg (165 lb).         reports that she has never smoked. She has never used smokeless tobacco.      Counseling Resources:  ATP IV Guidelines  Pooled Cohorts Equation Calculator  Breast Cancer Risk Calculator  FRAX Risk Assessment  ICSI Preventive Guidelines  Dietary Guidelines for Americans, 2010  USDA's MyPlate  ASA Prophylaxis  Lung CA Screening    Bryanna Winter PA-C  [facility]  "

## 2019-08-23 NOTE — PATIENT INSTRUCTIONS
Schedule mammogram        Preventive Health Recommendations  Female Ages 50 - 64    Yearly exam: See your health care provider every year in order to  o Review health changes.   o Discuss preventive care.    o Review your medicines if your doctor has prescribed any.      Get a Pap test every three years (unless you have an abnormal result and your provider advises testing more often).    If you get Pap tests with HPV test, you only need to test every 5 years, unless you have an abnormal result.     You do not need a Pap test if your uterus was removed (hysterectomy) and you have not had cancer.    You should be tested each year for STDs (sexually transmitted diseases) if you're at risk.     Have a mammogram every 1 to 2 years.    Have a colonoscopy at age 50, or have a yearly FIT test (stool test). These exams screen for colon cancer.      Have a cholesterol test every 5 years, or more often if advised.    Have a diabetes test (fasting glucose) every three years. If you are at risk for diabetes, you should have this test more often.     If you are at risk for osteoporosis (brittle bone disease), think about having a bone density scan (DEXA).    Shots: Get a flu shot each year. Get a tetanus shot every 10 years.    Nutrition:     Eat at least 5 servings of fruits and vegetables each day.    Eat whole-grain bread, whole-wheat pasta and brown rice instead of white grains and rice.    Get adequate Calcium and Vitamin D.     Lifestyle    Exercise at least 150 minutes a week (30 minutes a day, 5 days a week). This will help you control your weight and prevent disease.    Limit alcohol to one drink per day.    No smoking.     Wear sunscreen to prevent skin cancer.     See your dentist every six months for an exam and cleaning.    See your eye doctor every 1 to 2 years.

## 2019-09-05 ENCOUNTER — OFFICE VISIT (OUTPATIENT)
Dept: FAMILY MEDICINE | Facility: CLINIC | Age: 54
End: 2019-09-05
Payer: COMMERCIAL

## 2019-09-05 VITALS
RESPIRATION RATE: 16 BRPM | HEIGHT: 70 IN | BODY MASS INDEX: 23.62 KG/M2 | TEMPERATURE: 98.1 F | WEIGHT: 165 LBS | HEART RATE: 55 BPM | SYSTOLIC BLOOD PRESSURE: 131 MMHG | DIASTOLIC BLOOD PRESSURE: 82 MMHG | OXYGEN SATURATION: 99 %

## 2019-09-05 DIAGNOSIS — N95.1 MENOPAUSAL SYNDROME (HOT FLASHES): ICD-10-CM

## 2019-09-05 DIAGNOSIS — Z71.89 ADVANCED DIRECTIVES, COUNSELING/DISCUSSION: ICD-10-CM

## 2019-09-05 DIAGNOSIS — Z00.00 ROUTINE GENERAL MEDICAL EXAMINATION AT A HEALTH CARE FACILITY: Primary | ICD-10-CM

## 2019-09-05 LAB
ALBUMIN SERPL-MCNC: 3.7 G/DL (ref 3.4–5)
ALP SERPL-CCNC: 90 U/L (ref 40–150)
ALT SERPL W P-5'-P-CCNC: 29 U/L (ref 0–50)
ANION GAP SERPL CALCULATED.3IONS-SCNC: 6 MMOL/L (ref 3–14)
AST SERPL W P-5'-P-CCNC: 20 U/L (ref 0–45)
BILIRUB SERPL-MCNC: 0.4 MG/DL (ref 0.2–1.3)
BUN SERPL-MCNC: 15 MG/DL (ref 7–30)
CALCIUM SERPL-MCNC: 9.2 MG/DL (ref 8.5–10.1)
CHLORIDE SERPL-SCNC: 110 MMOL/L (ref 94–109)
CHOLEST SERPL-MCNC: 184 MG/DL
CO2 SERPL-SCNC: 26 MMOL/L (ref 20–32)
CREAT SERPL-MCNC: 0.8 MG/DL (ref 0.52–1.04)
GFR SERPL CREATININE-BSD FRML MDRD: 82 ML/MIN/{1.73_M2}
GLUCOSE SERPL-MCNC: 75 MG/DL (ref 70–99)
HDLC SERPL-MCNC: 54 MG/DL
LDLC SERPL CALC-MCNC: 111 MG/DL
NONHDLC SERPL-MCNC: 130 MG/DL
POTASSIUM SERPL-SCNC: 3.9 MMOL/L (ref 3.4–5.3)
PROT SERPL-MCNC: 7.9 G/DL (ref 6.8–8.8)
SODIUM SERPL-SCNC: 142 MMOL/L (ref 133–144)
TRIGL SERPL-MCNC: 94 MG/DL

## 2019-09-05 PROCEDURE — 90471 IMMUNIZATION ADMIN: CPT | Performed by: PHYSICIAN ASSISTANT

## 2019-09-05 PROCEDURE — 80061 LIPID PANEL: CPT | Performed by: PHYSICIAN ASSISTANT

## 2019-09-05 PROCEDURE — 90715 TDAP VACCINE 7 YRS/> IM: CPT | Performed by: PHYSICIAN ASSISTANT

## 2019-09-05 PROCEDURE — 99396 PREV VISIT EST AGE 40-64: CPT | Mod: 25 | Performed by: PHYSICIAN ASSISTANT

## 2019-09-05 PROCEDURE — 36415 COLL VENOUS BLD VENIPUNCTURE: CPT | Performed by: PHYSICIAN ASSISTANT

## 2019-09-05 PROCEDURE — 80053 COMPREHEN METABOLIC PANEL: CPT | Performed by: PHYSICIAN ASSISTANT

## 2019-09-05 ASSESSMENT — ENCOUNTER SYMPTOMS
FEVER: 0
NAUSEA: 0
EYE PAIN: 0
HEARTBURN: 0
HEMATURIA: 0
CHILLS: 0
MYALGIAS: 0
PARESTHESIAS: 0
COUGH: 0
PALPITATIONS: 0
SORE THROAT: 0
HEMATOCHEZIA: 0
HEADACHES: 0
ARTHRALGIAS: 0
SHORTNESS OF BREATH: 0
DYSURIA: 0
NERVOUS/ANXIOUS: 0
DIARRHEA: 0
ABDOMINAL PAIN: 0
WEAKNESS: 0
JOINT SWELLING: 0
DIZZINESS: 0
CONSTIPATION: 0
FREQUENCY: 0

## 2019-09-05 ASSESSMENT — MIFFLIN-ST. JEOR: SCORE: 1418.69

## 2019-09-06 NOTE — RESULT ENCOUNTER NOTE
Vianca Nair,       Your recent test results are attached, if you have any questions or concerns please feel free to contact me via e-mail or call [phone].  Labs are normal. Normal kidney and liver function. No diabetes. Cholesterol is to goal for you.        It was a pleasure to see you at your recent office visit.      Sincerely,  Bryanna Winter PA-C

## 2019-10-24 ENCOUNTER — ANCILLARY PROCEDURE (OUTPATIENT)
Dept: MAMMOGRAPHY | Facility: CLINIC | Age: 54
End: 2019-10-24
Payer: COMMERCIAL

## 2019-10-24 DIAGNOSIS — Z00.00 ROUTINE GENERAL MEDICAL EXAMINATION AT A HEALTH CARE FACILITY: ICD-10-CM

## 2019-10-24 PROCEDURE — 77067 SCR MAMMO BI INCL CAD: CPT | Mod: TC

## 2019-11-03 ENCOUNTER — HEALTH MAINTENANCE LETTER (OUTPATIENT)
Age: 54
End: 2019-11-03

## 2020-11-16 ENCOUNTER — HEALTH MAINTENANCE LETTER (OUTPATIENT)
Age: 55
End: 2020-11-16

## 2021-02-07 ENCOUNTER — HEALTH MAINTENANCE LETTER (OUTPATIENT)
Age: 56
End: 2021-02-07

## 2021-02-16 ASSESSMENT — ENCOUNTER SYMPTOMS
COUGH: 0
FREQUENCY: 0
HEADACHES: 0
DIZZINESS: 0
BREAST MASS: 0
NERVOUS/ANXIOUS: 0
WEAKNESS: 0
HEMATURIA: 0
CHILLS: 0
SORE THROAT: 0
DIARRHEA: 0
NAUSEA: 0
DYSURIA: 0
HEARTBURN: 0
CONSTIPATION: 0
MYALGIAS: 0
ARTHRALGIAS: 0
EYE PAIN: 0
JOINT SWELLING: 0
HEMATOCHEZIA: 0
PALPITATIONS: 0
ABDOMINAL PAIN: 0
SHORTNESS OF BREATH: 0
PARESTHESIAS: 0
FEVER: 0

## 2021-02-16 NOTE — PROGRESS NOTES
SUBJECTIVE:   CC: Joanie Coe is an 55 year old woman who presents for preventive health visit.     Patient has been advised of split billing requirements and indicates understanding: Yes     Healthy Habits:    Pt is not fasting and did not have labs completed. Not due, will get next year. bmi normal and had normal labs 2 years ago.     PAP is due per pt.    Answers for HPI/ROS submitted by the patient on 2/16/2021   Annual Exam:  Frequency of exercise:: 4-5 days/week  Getting at least 3 servings of Calcium per day:: Yes  Diet:: Regular (no restrictions)  Taking medications regularly:: Yes  Medication side effects:: None  Bi-annual eye exam:: NO  Dental care twice a year:: Yes  Sleep apnea or symptoms of sleep apnea:: None  abdominal pain: No  Blood in stool: No  Blood in urine: No  chest pain: No  chills: No  congestion: No  constipation: No  cough: No  diarrhea: No  dizziness: No  ear pain: No  eye pain: No  nervous/anxious: No  fever: No  frequency: No  genital sores: No  headaches: No  hearing loss: No  heartburn: No  arthralgias: No  joint swelling: No  peripheral edema: No  mood changes: No  myalgias: No  nausea: No  dysuria: No  palpitations: No  Skin sensation changes: No  sore throat: No  urgency: No  rash: No  shortness of breath: No  visual disturbance: No  weakness: No  pelvic pain: No  vaginal bleeding: No  vaginal discharge: No  tenderness: No  breast mass: No  breast discharge: No  Additional concerns today:: Yes,     -per pt discuss menopause SX, wondering if there is something to help with hot flashes. Mild hot flashes.   Still has uterus.  Tried otc estrogen. Thought it helped. Stopped taking it because she wanted to ask about it first. No side effects.    No personal h/o breast cancer or DVT or CAD.   Sister with uterine cancer.       Duration of exercise:: 30-45 minutes      Today's PHQ-2 Score:   PHQ-2 ( 1999 Pfizer) 2/16/2021 9/5/2019   Q1: Little interest or pleasure in doing things 0 0    Q2: Feeling down, depressed or hopeless 0 0   PHQ-2 Score 0 0   Q1: Little interest or pleasure in doing things Not at all Not at all   Q2: Feeling down, depressed or hopeless Not at all Not at all   PHQ-2 Score 0 0       Abuse: Current or Past(Physical, Sexual or Emotional)- No  Do you feel safe in your environment? Yes        Social History     Tobacco Use     Smoking status: Never Smoker     Smokeless tobacco: Never Used   Substance Use Topics     Alcohol use: Yes     Comment: occ     If you drink alcohol do you typically have >3 drinks per day or >7 drinks per week? No                     Reviewed orders with patient.  Reviewed health maintenance and updated orders accordingly - Yes  Lab work is in process  Labs reviewed in EPIC  BP Readings from Last 3 Encounters:   02/19/21 123/76   09/05/19 131/82   08/09/18 125/68    Wt Readings from Last 3 Encounters:   02/19/21 74.4 kg (164 lb)   09/05/19 74.8 kg (165 lb)   08/09/18 74.4 kg (164 lb)                  Patient Active Problem List   Diagnosis     CARDIOVASCULAR SCREENING; LDL GOAL LESS THAN 160     Cervical polyp     Advanced directives, counseling/discussion     Past Surgical History:   Procedure Laterality Date     ARTHROSCOPY ANKLE, OPEN REPAIR LIGAMENT, COMBINED Right 8/9/2018    Procedure: COMBINED ARTHROSCOPY ANKLE, OPEN REPAIR LIGAMENT;  Right Ankle Arthroscopic Evaluation & Debridement &  Lateral Ligament Repair With Internal Augmentation &  Peroneal Tendon Repair & Great Toe Tendon Decompression;  Surgeon: Javy Gross DPM;  Location: [facility]     COLONOSCOPY  3/28/2014    Procedure: COLONOSCOPY;  Colonoscopy;  Surgeon: Everett Gray MD;  Location: [facility]     HC TOOTH EXTRACTION W/FORCEP      root canal     PELVIS LAPAROSCOPY,DX         Social History     Tobacco Use     Smoking status: Never Smoker     Smokeless tobacco: Never Used   Substance Use Topics     Alcohol use: Yes     Comment: occ     Family History   Problem Relation Age  of Onset     Diabetes Father      Hypertension Father      Lipids Father      Cerebrovascular Disease Father         Late 70s     Thyroid Disease Sister          Current Outpatient Medications   Medication Sig Dispense Refill     CLARITIN 10 MG OR TABS 1 TABLET DAILY       Allergies   Allergen Reactions     Nka [No Known Allergies]        Breast CA Risk Screening:  Breast CA Risk Assessment (FHS-7) 2/16/2021   Did any of your first-degree relatives have breast or ovarian cancer? Yes   Did any of your relatives have bilateral breast cancer? No   Did any man in your family have breast cancer? No   Did any woman in your family have breast and ovarian cancer? No   Did any woman in your family have breast cancer before age 50 y? No   Do you have 2 or more relatives with breast and/or ovarian cancer? No   Do you have 2 or more relatives with breast and/or bowel cancer? No     Breast CA Risk Assessment (FHS-7) 2/16/2021   Did any of your first-degree relatives have breast or ovarian cancer? Yes   Did any of your relatives have bilateral breast cancer? No   Did any man in your family have breast cancer? No   Did any woman in your family have breast and ovarian cancer? No   Did any woman in your family have breast cancer before age 50 y? No   Do you have 2 or more relatives with breast and/or ovarian cancer? No   Do you have 2 or more relatives with breast and/or bowel cancer? No     click delete button to remove this line now  Mammogram Screening: Recommended mammography every 1-2 years with patient discussion and risk factor consideration  Pertinent mammograms are reviewed under the imaging tab.    Pertinent mammograms are reviewed under the imaging tab.  History of abnormal Pap smear: NO - age 30- 65 PAP every 3 years recommended  PAP / HPV Latest Ref Rng & Units 11/20/2017 6/4/2013 5/19/2008   PAP - OTHER-NIL, See Result NIL NIL   HPV 16 DNA NEG:Negative Negative - -   HPV 18 DNA NEG:Negative Negative - -   OTHER HR HPV  "NEG:Negative Negative - -     Reviewed and updated as needed this visit by clinical staff  Tobacco  Allergies  Meds   Med Hx  Surg Hx  Fam Hx  Soc Hx        Reviewed and updated as needed this visit by Provider                Past Medical History:   Diagnosis Date     NO ACTIVE PROBLEMS       Past Surgical History:   Procedure Laterality Date     ARTHROSCOPY ANKLE, OPEN REPAIR LIGAMENT, COMBINED Right 2018    Procedure: COMBINED ARTHROSCOPY ANKLE, OPEN REPAIR LIGAMENT;  Right Ankle Arthroscopic Evaluation & Debridement &  Lateral Ligament Repair With Internal Augmentation &  Peroneal Tendon Repair & Great Toe Tendon Decompression;  Surgeon: Javy Gross DPM;  Location: [facility]     COLONOSCOPY  3/28/2014    Procedure: COLONOSCOPY;  Colonoscopy;  Surgeon: Everett Gray MD;  Location: [facility]     HC TOOTH EXTRACTION W/FORCEP      root canal     PELVIS LAPAROSCOPY,DX       OB History    Para Term  AB Living   2 1 0 0 0 2   SAB TAB Ectopic Multiple Live Births   0 0 0 1 0      # Outcome Date GA Lbr Goyo/2nd Weight Sex Delivery Anes PTL Lv   2             1 Para  35w0d             Birth Comments: twin pregnancy       ROS:  CONSTITUTIONAL: NEGATIVE for fever, chills, change in weight  INTEGUMENTARY/SKIN: NEGATIVE for worrisome rashes, moles or lesions  EYES: NEGATIVE for vision changes or irritation  ENT: NEGATIVE for ear, mouth and throat problems  RESP: NEGATIVE for significant cough or SOB  BREAST: NEGATIVE for masses, tenderness or discharge  CV: NEGATIVE for chest pain, palpitations or peripheral edema  GI: NEGATIVE for nausea, abdominal pain, heartburn, or change in bowel habits  MUSCULOSKELETAL: NEGATIVE for significant arthralgias or myalgia  NEURO: NEGATIVE for weakness, dizziness or paresthesias  PSYCHIATRIC: NEGATIVE for changes in mood or affect     OBJECTIVE:   /76   Pulse 68   Temp 97.8  F (36.6  C) (Tympanic)   Resp 14   Ht 1.778 m (5' 10\")   Wt 74.4 " kg (164 lb)   SpO2 100%   Breastfeeding No   BMI 23.53 kg/m    EXAM:  GENERAL: healthy, alert and no distress  EYES: Eyes grossly normal to inspection, PERRL and conjunctivae and sclerae normal  HENT: ear canals and TM's normal, nose and mouth without ulcers or lesions  NECK: no adenopathy, no asymmetry, masses, or scars and thyroid normal to palpation  RESP: lungs clear to auscultation - no rales, rhonchi or wheezes  BREAST: normal without masses, tenderness or nipple discharge and no palpable axillary masses or adenopathy  CV: regular rate and rhythm, normal S1 S2, no S3 or S4, no murmur, click or rub, no peripheral edema and peripheral pulses strong  ABDOMEN: soft, nontender, no hepatosplenomegaly, no masses and bowel sounds normal   (female): normal female external genitalia, normal urethral meatus, vaginal mucosa pink, moist, well rugated, and normal cervixwithout masses or discharge  MS: no gross musculoskeletal defects noted, no edema  SKIN: no suspicious lesions or rashes  NEURO: Normal strength and tone, mentation intact and speech normal  PSYCH: mentation appears normal, affect normal/bright    Diagnostic Test Results:  Labs reviewed in Epic    ASSESSMENT/PLAN:   1. Routine general medical examination at a health care facility    - Pap imaged thin layer screen with HPV - recommended age 30 - 65 years (select HPV order below)  - HPV High Risk Types DNA Cervical  - *MA Screening Digital Bilateral; Future    2. Menopausal syndrome (hot flashes)  Went over options including prescription options risks/benefits  She wants to try otc first  See patient instructions below for more plan.        Patient has been advised of split billing requirements and indicates understanding: Yes  COUNSELING:   Reviewed preventive health counseling, as reflected in patient instructions       Regular exercise       Healthy diet/nutrition       Vision screening       Hearing screening    Estimated body mass index is 23.53 kg/m   "as calculated from the following:    Height as of this encounter: 1.778 m (5' 10\").    Weight as of this encounter: 74.4 kg (164 lb).        She reports that she has never smoked. She has never used smokeless tobacco.    Patient Instructions   Evening primrose, black cohosh are natural options to try   Less caffeine, alcohol if applicable  Drink plenty of water  If this does not help we can try hormone replacement therapy or low dose antidepressant like celexa are options    Schedule mammogram        Preventive Health Recommendations  Female Ages 50 - 64    Yearly exam: See your health care provider every year in order to  o Review health changes.   o Discuss preventive care.    o Review your medicines if your doctor has prescribed any.      Get a Pap test every three years (unless you have an abnormal result and your provider advises testing more often).    If you get Pap tests with HPV test, you only need to test every 5 years, unless you have an abnormal result.     You do not need a Pap test if your uterus was removed (hysterectomy) and you have not had cancer.    You should be tested each year for STDs (sexually transmitted diseases) if you're at risk.     Have a mammogram every 1 to 2 years.    Have a colonoscopy at age 50, or have a yearly FIT test (stool test). These exams screen for colon cancer.      Have a cholesterol test every 5 years, or more often if advised.    Have a diabetes test (fasting glucose) every three years. If you are at risk for diabetes, you should have this test more often.     If you are at risk for osteoporosis (brittle bone disease), think about having a bone density scan (DEXA).    Shots: Get a flu shot each year. Get a tetanus shot every 10 years.    Nutrition:     Eat at least 5 servings of fruits and vegetables each day.    Eat whole-grain bread, whole-wheat pasta and brown rice instead of white grains and rice.    Get adequate Calcium and Vitamin D.     Lifestyle    Exercise at " least 150 minutes a week (30 minutes a day, 5 days a week). This will help you control your weight and prevent disease.    Limit alcohol to one drink per day.    No smoking.     Wear sunscreen to prevent skin cancer.     See your dentist every six months for an exam and cleaning.    See your eye doctor every 1 to 2 years.        Counseling Resources:  ATP IV Guidelines  Pooled Cohorts Equation Calculator  Breast Cancer Risk Calculator  BRCA-Related Cancer Risk Assessment: FHS-7 Tool  FRAX Risk Assessment  ICSI Preventive Guidelines  Dietary Guidelines for Americans, 2010  USDA's MyPlate  ASA Prophylaxis  Lung CA Screening    KARLIE Cox [facility]

## 2021-02-16 NOTE — PATIENT INSTRUCTIONS
Evening primrose, black cohosh are natural options to try   Less caffeine, alcohol if applicable  Drink plenty of water  If this does not help we can try hormone replacement therapy or low dose antidepressant like celexa are options    Schedule mammogram        Preventive Health Recommendations  Female Ages 50 - 64    Yearly exam: See your health care provider every year in order to  o Review health changes.   o Discuss preventive care.    o Review your medicines if your doctor has prescribed any.      Get a Pap test every three years (unless you have an abnormal result and your provider advises testing more often).    If you get Pap tests with HPV test, you only need to test every 5 years, unless you have an abnormal result.     You do not need a Pap test if your uterus was removed (hysterectomy) and you have not had cancer.    You should be tested each year for STDs (sexually transmitted diseases) if you're at risk.     Have a mammogram every 1 to 2 years.    Have a colonoscopy at age 50, or have a yearly FIT test (stool test). These exams screen for colon cancer.      Have a cholesterol test every 5 years, or more often if advised.    Have a diabetes test (fasting glucose) every three years. If you are at risk for diabetes, you should have this test more often.     If you are at risk for osteoporosis (brittle bone disease), think about having a bone density scan (DEXA).    Shots: Get a flu shot each year. Get a tetanus shot every 10 years.    Nutrition:     Eat at least 5 servings of fruits and vegetables each day.    Eat whole-grain bread, whole-wheat pasta and brown rice instead of white grains and rice.    Get adequate Calcium and Vitamin D.     Lifestyle    Exercise at least 150 minutes a week (30 minutes a day, 5 days a week). This will help you control your weight and prevent disease.    Limit alcohol to one drink per day.    No smoking.     Wear sunscreen to prevent skin cancer.     See your dentist every  six months for an exam and cleaning.    See your eye doctor every 1 to 2 years.

## 2021-02-19 ENCOUNTER — OFFICE VISIT (OUTPATIENT)
Dept: FAMILY MEDICINE | Facility: CLINIC | Age: 56
End: 2021-02-19
Payer: COMMERCIAL

## 2021-02-19 VITALS
DIASTOLIC BLOOD PRESSURE: 76 MMHG | RESPIRATION RATE: 14 BRPM | TEMPERATURE: 97.8 F | BODY MASS INDEX: 23.48 KG/M2 | HEIGHT: 70 IN | WEIGHT: 164 LBS | HEART RATE: 68 BPM | OXYGEN SATURATION: 100 % | SYSTOLIC BLOOD PRESSURE: 123 MMHG

## 2021-02-19 DIAGNOSIS — N95.1 MENOPAUSAL SYNDROME (HOT FLASHES): ICD-10-CM

## 2021-02-19 DIAGNOSIS — Z00.00 ROUTINE GENERAL MEDICAL EXAMINATION AT A HEALTH CARE FACILITY: Primary | ICD-10-CM

## 2021-02-19 PROCEDURE — G0145 SCR C/V CYTO,THINLAYER,RESCR: HCPCS | Performed by: PHYSICIAN ASSISTANT

## 2021-02-19 PROCEDURE — 87624 HPV HI-RISK TYP POOLED RSLT: CPT | Performed by: PHYSICIAN ASSISTANT

## 2021-02-19 PROCEDURE — 99396 PREV VISIT EST AGE 40-64: CPT | Performed by: PHYSICIAN ASSISTANT

## 2021-02-19 ASSESSMENT — MIFFLIN-ST. JEOR: SCORE: 1409.15

## 2021-02-24 LAB
COPATH REPORT: NORMAL
PAP: NORMAL

## 2021-02-25 LAB
FINAL DIAGNOSIS: NORMAL
HPV HR 12 DNA CVX QL NAA+PROBE: NEGATIVE
HPV16 DNA SPEC QL NAA+PROBE: NEGATIVE
HPV18 DNA SPEC QL NAA+PROBE: NEGATIVE
SPECIMEN DESCRIPTION: NORMAL
SPECIMEN SOURCE CVX/VAG CYTO: NORMAL

## 2021-03-09 ENCOUNTER — OFFICE VISIT (OUTPATIENT)
Dept: OPHTHALMOLOGY | Facility: CLINIC | Age: 56
End: 2021-03-09
Attending: OPHTHALMOLOGY
Payer: COMMERCIAL

## 2021-03-09 DIAGNOSIS — H52.4 PRESBYOPIA: ICD-10-CM

## 2021-03-09 DIAGNOSIS — H52.203 HYPEROPIC ASTIGMATISM OF BOTH EYES: ICD-10-CM

## 2021-03-09 DIAGNOSIS — H25.13 NUCLEAR SCLEROTIC CATARACT OF BOTH EYES: Primary | ICD-10-CM

## 2021-03-09 PROCEDURE — 92004 COMPRE OPH EXAM NEW PT 1/>: CPT | Performed by: OPHTHALMOLOGY

## 2021-03-09 PROCEDURE — G0463 HOSPITAL OUTPT CLINIC VISIT: HCPCS

## 2021-03-09 ASSESSMENT — CUP TO DISC RATIO
OS_RATIO: 0.25
OD_RATIO: 0.25

## 2021-03-09 ASSESSMENT — REFRACTION_MANIFEST
OS_CYLINDER: SPHERE
OD_CYLINDER: SPHERE
OD_SPHERE: +1.75
OS_ADD: +2.25
OS_SPHERE: +1.50
OD_CYLINDER: SPHERE
OS_SPHERE: +0.25
OS_CYLINDER: SPHERE
OD_ADD: +2.25
OD_SPHERE: +0.50

## 2021-03-09 ASSESSMENT — SLIT LAMP EXAM - LIDS
COMMENTS: NORMAL
COMMENTS: NORMAL

## 2021-03-09 ASSESSMENT — CONF VISUAL FIELD
OD_NORMAL: 1
OS_NORMAL: 1
METHOD: COUNTING FINGERS

## 2021-03-09 ASSESSMENT — VISUAL ACUITY
METHOD: SNELLEN - LINEAR
OS_SC: J7
OD_SC: J7
OD_SC+: -2
OD_SC: 20/20
OS_SC: 20/20

## 2021-03-09 ASSESSMENT — EXTERNAL EXAM - LEFT EYE: OS_EXAM: NORMAL

## 2021-03-09 ASSESSMENT — EXTERNAL EXAM - RIGHT EYE: OD_EXAM: NORMAL

## 2021-03-09 ASSESSMENT — TONOMETRY
IOP_METHOD: ICARE
OD_IOP_MMHG: 9
OS_IOP_MMHG: 10

## 2021-03-09 NOTE — NURSING NOTE
Chief Complaints and History of Present Illnesses   Patient presents with     Consult For     New Pt.  CEE.     Chief Complaint(s) and History of Present Illness(es)     Consult For     Laterality: both eyes    Onset: gradual    Onset: years ago    Course: stable    Associated symptoms: glare.  Negative for eye pain, dryness, tearing, flashes, floaters, haloes, double vision and photophobia    Treatments tried: no treatments    Pain scale: 0/10    Comments: New Pt.  CEE.              Comments     Pt states vision is stable DVA and NVA she is needing stronger readers over the past year.  Last eye appt was 2-3 years ago.  Here for annual check up.  She has no pain or other concerns at this time.    MARKY Maurer March 9, 2021 8:50 AM

## 2021-03-09 NOTE — PROGRESS NOTES
HPI:  Joanie Coe is a 55 year old female here for full eye exam. She feels her vision is overall good at distance, but is noticing increasing need for stronger reading glasses. No pain, redness, discharge. No flashes/floaters.     Assessment & Plan     (H25.13) Nuclear sclerotic cataract of both eyes  (primary encounter diagnosis)  Comment: Very mild as expected for age  Plan: Observe    (H52.203) Hyperopic astigmatism of both eyes  (H52.4) Presbyopia  Comment: Good vision with refraction  Plan: Given updated glasses Rx.         -----------------------------------------------------------------------------------    Patient disposition:   Return in about 1 year (around 3/9/2022) for dilated eye exam, or sooner as needed.    Teaching statement:  Complete documentation of historical and exam elements from today's encounter can be found in the full encounter summary report (not reduplicated in this progress note). I personally obtained the chief complaint(s) and history of present illness.  I confirmed and edited as necessary the review of systems, past medical/surgical history, family history, social history, and examination findings as documented by others; and I examined the patient myself. I personally reviewed the relevant tests, images, and reports as documented above.     I formulated and edited as necessary the assessment and plan and discussed the findings and management plan with the patient and family.      Jackelin Burk MD  Comprehensive Ophthalmology & Ocular Pathology  Department of Ophthalmology and Visual Neurosciences  [email or web address]  Pager [phone]

## 2021-03-31 ENCOUNTER — IMMUNIZATION (OUTPATIENT)
Dept: FAMILY MEDICINE | Facility: CLINIC | Age: 56
End: 2021-03-31
Payer: COMMERCIAL

## 2021-03-31 PROCEDURE — 91301 PR COVID VAC MODERNA 100 MCG/0.5 ML IM: CPT

## 2021-03-31 PROCEDURE — 0011A PR COVID VAC MODERNA 100 MCG/0.5 ML IM: CPT

## 2021-04-22 ENCOUNTER — HOSPITAL ENCOUNTER (OUTPATIENT)
Dept: MAMMOGRAPHY | Facility: CLINIC | Age: 56
Discharge: HOME | End: 2021-04-22
Attending: PHYSICIAN ASSISTANT | Admitting: PHYSICIAN ASSISTANT
Payer: COMMERCIAL

## 2021-04-22 DIAGNOSIS — Z12.31 VISIT FOR SCREENING MAMMOGRAM: ICD-10-CM

## 2021-04-22 PROCEDURE — 77067 SCR MAMMO BI INCL CAD: CPT

## 2021-04-28 ENCOUNTER — IMMUNIZATION (OUTPATIENT)
Dept: FAMILY MEDICINE | Facility: CLINIC | Age: 56
End: 2021-04-28
Attending: FAMILY MEDICINE
Payer: COMMERCIAL

## 2021-04-28 PROCEDURE — 0012A PR COVID VAC MODERNA 100 MCG/0.5 ML IM: CPT

## 2021-04-28 PROCEDURE — 91301 PR COVID VAC MODERNA 100 MCG/0.5 ML IM: CPT

## 2021-08-09 ENCOUNTER — E-VISIT (OUTPATIENT)
Dept: FAMILY MEDICINE | Facility: CLINIC | Age: 56
End: 2021-08-09
Payer: COMMERCIAL

## 2021-08-09 DIAGNOSIS — Z20.822 SUSPECTED COVID-19 VIRUS INFECTION: Primary | ICD-10-CM

## 2021-08-09 PROCEDURE — 99421 OL DIG E/M SVC 5-10 MIN: CPT | Performed by: PHYSICIAN ASSISTANT

## 2021-08-10 ENCOUNTER — NURSE TRIAGE (OUTPATIENT)
Dept: FAMILY MEDICINE | Facility: CLINIC | Age: 56
End: 2021-08-10

## 2021-08-10 NOTE — PATIENT INSTRUCTIONS
Dear Joanie Coe,    Your symptoms show that you may have coronavirus (COVID-19). This illness can cause fever, cough and trouble breathing. Many people get a mild case and get better on their own. Some people can get very sick.    Will I be tested for COVID-19?  We would like to test you for Covid-19 virus. I have placed orders for this test.     To schedule: go to your [employer] home page and scroll down to the section that says  You have an appointment that needs to be scheduled  and click the large green button that says  Schedule Now  and follow the steps to find the next available openings.    If you are unable to complete these [employer] scheduling steps, please call [phone] to schedule your testing.     Return to work/school/ guidance:  Please let your workplace manager and staffing office know when your quarantine ends     We can t give you an exact date as it depends on the above. You can calculate this on your own or work with your manager/staffing office to calculate this. (For example if you were exposed on 10/4, you would have to quarantine for 14 full days. That would be through 10/18. You could return on 10/19.)      If you receive a positive COVID-19 test result, follow the guidance of the those who are giving you the results. Usually the return to work is 10 (or in some cases 20 days from symptom onset.) If you work at [facility], you must also be cleared by Employee Occupational Health and Safety to return to work.        If you receive a negative COVID-19 test result and did not have a high risk exposure to someone with a known positive COVID-19 test, you can return to work once you're free of fever for 24 hours without fever-reducing medication and your symptoms are improving or resolved.      If you receive a negative COVID-19 test and If you had a high risk exposure to someone who has tested positive for COVID-19 then you can return to work 14 days after your last contact with  the positive individual    Note: If you have ongoing exposure to the covid positive person, this quarantine period may be more than 14 days. (For example, if you are continued to be exposed to your child who tested positive and cannot isolate from them, then the quarantine of 7-14 days can't start until your child is no longer contagious. This is typically 10 days from onset of the child's symptoms. So the total duration may be 17-24 days in this case.)    Sign up for [employer].   We know it's scary to hear that you might have COVID-19. We want to track your symptoms to make sure you're okay over the next 2 weeks. Please look for an email from [employer]--this is a free, online program that we'll use to keep in touch. To sign up, follow the link in the email you will receive. Learn more at [email or web address]    How can I take care of myself?    Get lots of rest. Drink extra fluids (unless a doctor has told you not to)    Take Tylenol (acetaminophen) or ibuprofen for fever or pain. If you have liver or kidney problems, ask your family doctor if it's okay to take Tylenol o ibuprofen    If you have other health problems (like cancer, heart failure, an organ transplant or severe kidney disease): Call your specialty clinic if you don't feel better in the next 2 days.    Know when to call 911. Emergency warning signs include:  o Trouble breathing or shortness of breath  o Pain or pressure in the chest that doesn't go away  o Feeling confused like you haven't felt before, or not being able to wake up  o Bluish-colored lips or face    Where can I get more information?  [facility] [employer] - About COVID-19:   [email or web address]    CDC - What to Do If You're Sick:   www.cdc.gov/coronavirus/2019-ncov/about/steps-when-sick.html

## 2021-08-10 NOTE — TELEPHONE ENCOUNTER
"Sneezy, itchy eyes, no covid exposure known, vaccinated  Does have seasonal allergies, Zertec   No confirmed covid exposure she was just worried  Camping outside all weekend, likely environmental exposure  Ok for home care seasonal allergies, home care advisement provided, instructed to call back if symptoms worsen or do not improve    Cammie Olivares RN, BSN, CMSRN  [facility]      Reason for Disposition    Nasal allergies occur only certain times of year    Additional Information    Negative: Lots of coughing    Negative: Lots of yellow or green discharge from nose, present > 3 days    Negative: Wheezing (high pitched whistling sound) and previous asthma attacks or use of asthma medicines    Negative: Doesn't match the SYMPTOMS for nasal allergy    Negative: Patient sounds very sick or weak to the triager    Negative: Nasal discharge present > 10 days    Negative: MODERATE-SEVERE nasal allergy symptoms (i.e., interfere with sleep, school, or work) and taking antihistamines > 2 days    Negative: Patient wants to be seen    Answer Assessment - Initial Assessment Questions  1. COVID-19 CLOSE CONTACT: \"Who is the person with the confirmed or suspected COVID-19 infection that you were exposed to?\"     No confirmed    2. PLACE of CONTACT: \"Where were you when you were exposed to COVID-19?\" (e.g., home, school, medical waiting room; which city?)      Camping over the weekend, the person exposed works at a   3. TYPE of CONTACT: \"How much contact was there?\" (e.g., sitting next to, live in same house, work in same office, same building)      Close contact  4. DURATION of CONTACT: \"How long were you in contact with the COVID-19 patient?\" (e.g., a few seconds, passed by person, a few minutes, 15 minutes or longer, live with the patient)      *No Answer*  5. MASK: \"Were you wearing a mask?\" \"Was the other person wearing a mask?\" Note: wearing a mask reduces the risk of an otherwise close " "contact.      no  6. DATE of CONTACT: \"When did you have contact with a COVID-19 patient?\" (e.g., how many days ago)      *No Answer*  7. COMMUNITY SPREAD: \"Are there lots of cases of COVID-19 (community spread) where you live?\" (See [facility] health department website, if unsure)        *No Answer*  8. SYMPTOMS: \"Do you have any symptoms?\" (e.g., fever, cough, breathing difficulty, loss of taste or smell)     Sneeze   9. PREGNANCY OR POSTPARTUM: \"Is there any chance you are pregnant?\" \"When was your last menstrual period?\" \"Did you deliver in the last 2 weeks?\"      *No Answer*  10. HIGH RISK: \"Do you have any heart or lung problems?\" \"Do you have a weak immune system?\" (e.g., heart failure, COPD, asthma, HIV positive, chemotherapy, renal failure, diabetes mellitus, sickle cell anemia, obesity)        *No Answer*  11. TRAVEL: \"Have you traveled out of the country recently?\" If so, \"When and where?\" Also ask about out-of-state travel, since the [facility] has identified some high-risk cities for community spread in the . Note: Travel becomes less relevant if there is widespread community transmission where the patient lives.        *No Answer*    Protocols used: NASAL ALLERGIES (HAY FEVER)-A-[place], CORONAVIRUS (COVID-19) EXPOSURE-A- 3.25      "

## 2021-09-18 ENCOUNTER — HEALTH MAINTENANCE LETTER (OUTPATIENT)
Age: 56
End: 2021-09-18

## 2021-11-30 ENCOUNTER — E-VISIT (OUTPATIENT)
Dept: FAMILY MEDICINE | Facility: CLINIC | Age: 56
End: 2021-11-30

## 2021-11-30 ENCOUNTER — IMMUNIZATION (OUTPATIENT)
Dept: FAMILY MEDICINE | Facility: CLINIC | Age: 56
End: 2021-11-30
Payer: COMMERCIAL

## 2021-11-30 DIAGNOSIS — B00.1 COLD SORE: Primary | ICD-10-CM

## 2021-11-30 PROCEDURE — 91306 COVID-19,PF,MODERNA (18+ YRS BOOSTER .25ML): CPT

## 2021-11-30 PROCEDURE — 0064A COVID-19,PF,MODERNA (18+ YRS BOOSTER .25ML): CPT

## 2021-11-30 PROCEDURE — 99422 OL DIG E/M SVC 11-20 MIN: CPT | Performed by: PHYSICIAN ASSISTANT

## 2021-11-30 RX ORDER — VALACYCLOVIR HYDROCHLORIDE 1 G/1
2000 TABLET, FILM COATED ORAL 2 TIMES DAILY
Qty: 12 TABLET | Refills: 3 | Status: SHIPPED | OUTPATIENT
Start: 2021-11-30 | End: 2022-08-09

## 2022-01-08 ENCOUNTER — HEALTH MAINTENANCE LETTER (OUTPATIENT)
Age: 57
End: 2022-01-08

## 2022-05-31 ENCOUNTER — HOSPITAL ENCOUNTER (OUTPATIENT)
Dept: MAMMOGRAPHY | Facility: CLINIC | Age: 57
Discharge: HOME | End: 2022-05-31
Attending: PHYSICIAN ASSISTANT | Admitting: PHYSICIAN ASSISTANT
Payer: COMMERCIAL

## 2022-05-31 DIAGNOSIS — Z12.31 VISIT FOR SCREENING MAMMOGRAM: ICD-10-CM

## 2022-05-31 PROCEDURE — 77067 SCR MAMMO BI INCL CAD: CPT

## 2022-08-02 ASSESSMENT — ENCOUNTER SYMPTOMS
HEARTBURN: 0
FEVER: 0
NERVOUS/ANXIOUS: 0
CHILLS: 0
PARESTHESIAS: 0
PALPITATIONS: 0
DIZZINESS: 0
HEMATOCHEZIA: 0
SORE THROAT: 0
COUGH: 0
MYALGIAS: 1
NAUSEA: 0
EYE PAIN: 0
DIARRHEA: 0
BREAST MASS: 0
HEADACHES: 0
CONSTIPATION: 0
ARTHRALGIAS: 0
JOINT SWELLING: 0
HEMATURIA: 0
WEAKNESS: 0
FREQUENCY: 0
SHORTNESS OF BREATH: 0
DYSURIA: 0
ABDOMINAL PAIN: 0

## 2022-08-03 ASSESSMENT — ENCOUNTER SYMPTOMS
NAUSEA: 0
JOINT SWELLING: 0
ABDOMINAL PAIN: 0
HEMATURIA: 0
SHORTNESS OF BREATH: 0
MYALGIAS: 1
DIZZINESS: 0
WEAKNESS: 0
NERVOUS/ANXIOUS: 0
DIARRHEA: 0
HEARTBURN: 0
BREAST MASS: 0
FEVER: 0
SORE THROAT: 0
ARTHRALGIAS: 0
EYE PAIN: 0
FREQUENCY: 0
HEMATOCHEZIA: 0
DYSURIA: 0
CONSTIPATION: 0
PARESTHESIAS: 0
COUGH: 0
CHILLS: 0
HEADACHES: 0
PALPITATIONS: 0

## 2022-08-03 NOTE — PROGRESS NOTES
SUBJECTIVE:   CC: Joanie Coe is an 57 year old woman who presents for preventive health visit.     Patient has been advised of split billing requirements and indicates understanding: Yes     Pt is fasting for labs.      1)  - Discuss hot flash sx. Sporadic throughout the day/night. Decreased sex drive and vaginal dryness. Tried otc gel did not help.  Friend is on effexor. We discussed benefits/risks of different options. She prefers to try hormonal replacement therapy for the lowest dose and lowest duration possible.   from last years discussion copied from my notes:-per pt discuss menopause SX, wondering if there is something to help with hot flashes. Mild hot flashes.   Still has uterus.  Tried otc estrogen. Thought it helped. Stopped taking it because she wanted to ask about it first. No side effects.    No personal h/o breast cancer or DVT or CAD.   Sister with uterine cancer.       2)  - L arm aching per pt on and off. Possible injury from lifting weight or from exercising. Since april.  Was doing yoga and home videos and now having pain lateral elbow and into forearm.   Very bothersome to her. She has not been fully resting it. Also bikes which causes pain.              Patient Instructions   Evening primrose, black cohosh are natural options to try   Less caffeine, alcohol if applicable  Drink plenty of water  If this does not help we can try hormone replacement therapy or low dose antidepressant like celexa are options    Not due for pap.   Mammogram up to date.         Healthy Habits:     Getting at least 3 servings of Calcium per day:  Yes    Bi-annual eye exam:  Yes    Dental care twice a year:  Yes    Sleep apnea or symptoms of sleep apnea:  Daytime drowsiness    Diet:  Regular (no restrictions)    Frequency of exercise:  4-5 days/week    Duration of exercise:  15-30 minutes    Taking medications regularly:  Yes    Medication side effects:  None    PHQ-2 Total Score: 0    Additional concerns today:   Yes        Today's PHQ-2 Score:   PHQ-2 ( 1999 [employer]) 8/2/2022   Q1: Little interest or pleasure in doing things 0   Q2: Feeling down, depressed or hopeless 0   PHQ-2 Score 0   PHQ-2 Total Score (12-17 Years)- Positive if 3 or more points; Administer PHQ-A if positive -   Q1: Little interest or pleasure in doing things Not at all   Q2: Feeling down, depressed or hopeless Not at all   PHQ-2 Score 0       Abuse: Current or Past (Physical, Sexual or Emotional) - No  Do you feel safe in your environment? Yes        Social History     Tobacco Use     Smoking status: Never Smoker     Smokeless tobacco: Never Used   Substance Use Topics     Alcohol use: Yes     Comment: occ     If you drink alcohol do you typically have >3 drinks per day or >7 drinks per week? No    Alcohol Use 8/2/2022   Prescreen: >3 drinks/day or >7 drinks/week? No   Prescreen: >3 drinks/day or >7 drinks/week? -   No flowsheet data found.    Reviewed orders with patient.  Reviewed health maintenance and updated orders accordingly - Yes  Lab work is in process  Labs reviewed in EPIC  BP Readings from Last 3 Encounters:   08/09/22 115/75   02/19/21 123/76   09/05/19 131/82    Wt Readings from Last 3 Encounters:   08/09/22 74.8 kg (165 lb)   02/19/21 74.4 kg (164 lb)   09/05/19 74.8 kg (165 lb)                  Patient Active Problem List   Diagnosis     Cervical polyp     Advanced directives, counseling/discussion     Past Surgical History:   Procedure Laterality Date     ARTHROSCOPY ANKLE, OPEN REPAIR LIGAMENT, COMBINED Right 08/09/2018    Procedure: COMBINED ARTHROSCOPY ANKLE, OPEN REPAIR LIGAMENT;  Right Ankle Arthroscopic Evaluation & Debridement &  Lateral Ligament Repair With Internal Augmentation &  Peroneal Tendon Repair & Great Toe Tendon Decompression;  Surgeon: Javy Gross DPM;  Location: [facility]     COLONOSCOPY  03/28/2014    Procedure: COLONOSCOPY;  Colonoscopy;  Surgeon: Everett Gray MD;  Location: [facility]      TOOTH  EXTRACTION W/FORCEP      root canal     PELVIS LAPAROSCOPY,DX         Social History     Tobacco Use     Smoking status: Never Smoker     Smokeless tobacco: Never Used   Substance Use Topics     Alcohol use: Yes     Comment: occ     Family History   Problem Relation Age of Onset     Diabetes Father      Hypertension Father      Lipids Father      Cerebrovascular Disease Father         Late 70s     Thyroid Disease Sister      Glaucoma No family hx of      Macular Degeneration No family hx of          Current Outpatient Medications   Medication Sig Dispense Refill     estrogen conj-medroxyPROGESTERone (PREMPRO) 0.3-1.5 MG tablet Take 1 tablet by mouth daily 30 tablet 11     Allergies   Allergen Reactions     Nka [No Known Allergies]        Breast Cancer Screening:    Breast CA Risk Assessment (FHS-7) 2/16/2021 8/2/2022   Do you have a family history of breast, colon, or ovarian cancer? Yes No / Unknown       click delete button to remove this line now  Mammogram Screening: Recommended mammography every 1-2 years with patient discussion and risk factor consideration  Pertinent mammograms are reviewed under the imaging tab.    History of abnormal Pap smear: NO - age 30- 65 PAP every 3 years recommended  PAP / HPV Latest Ref Rng & Units 2/19/2021 11/20/2017 6/4/2013   PAP (Historical) - NIL OTHER-NIL, See Result NIL   HPV16 NEG:Negative Negative Negative -   HPV18 NEG:Negative Negative Negative -   HRHPV NEG:Negative Negative Negative -     Reviewed and updated as needed this visit by clinical staff   Tobacco     Med Hx  Surg Hx  Fam Hx  Soc Hx          Reviewed and updated as needed this visit by Provider                   Past Medical History:   Diagnosis Date     Need for prophylactic hormone replacement therapy (postmenopausal)      NO ACTIVE PROBLEMS       Past Surgical History:   Procedure Laterality Date     ARTHROSCOPY ANKLE, OPEN REPAIR LIGAMENT, COMBINED Right 08/09/2018    Procedure: COMBINED ARTHROSCOPY  "ANKLE, OPEN REPAIR LIGAMENT;  Right Ankle Arthroscopic Evaluation & Debridement &  Lateral Ligament Repair With Internal Augmentation &  Peroneal Tendon Repair & Great Toe Tendon Decompression;  Surgeon: Javy Gross DPM;  Location: [facility]     COLONOSCOPY  2014    Procedure: COLONOSCOPY;  Colonoscopy;  Surgeon: Everett Gray MD;  Location: [facility]     HC TOOTH EXTRACTION W/FORCEP      root canal     PELVIS LAPAROSCOPY,DX       OB History    Para Term  AB Living   2 1 0 0 0 2   SAB IAB Ectopic Multiple Live Births   0 0 0 1 0      # Outcome Date GA Lbr Goyo/2nd Weight Sex Delivery Anes PTL Lv   2             1 Para  35w0d             Birth Comments: twin pregnancy       Review of Systems   Constitutional: Negative for chills and fever.   HENT: Negative for congestion, ear pain, hearing loss and sore throat.    Eyes: Negative for pain and visual disturbance.   Respiratory: Negative for cough and shortness of breath.    Cardiovascular: Negative for chest pain, palpitations and peripheral edema.   Gastrointestinal: Negative for abdominal pain, constipation, diarrhea, heartburn, hematochezia and nausea.   Breasts:  Negative for tenderness, breast mass and discharge.   Genitourinary: Negative for dysuria, frequency, genital sores, hematuria, pelvic pain, urgency, vaginal bleeding and vaginal discharge.   Musculoskeletal: Positive for myalgias. Negative for arthralgias and joint swelling.   Skin: Negative for rash.   Neurological: Negative for dizziness, weakness, headaches and paresthesias.   Psychiatric/Behavioral: Negative for mood changes. The patient is not nervous/anxious.           OBJECTIVE:   /75   Pulse 65   Temp 97.5  F (36.4  C) (Tympanic)   Resp 16   Ht 1.778 m (5' 10\")   Wt 74.8 kg (165 lb)   SpO2 98%   Breastfeeding No   BMI 23.68 kg/m    Physical Exam  GENERAL: healthy, alert and no distress  EYES: Eyes grossly normal to inspection, PERRL and " conjunctivae and sclerae normal  HENT: ear canals and TM's normal, nose and mouth without ulcers or lesions  NECK: no adenopathy, no asymmetry, masses, or scars and thyroid normal to palpation  RESP: lungs clear to auscultation - no rales, rhonchi or wheezes  BREAST: normal without masses, tenderness or nipple discharge and no palpable axillary masses or adenopathy  CV: regular rate and rhythm, normal S1 S2, no S3 or S4, no murmur, click or rub, no peripheral edema and peripheral pulses strong  ABDOMEN: soft, nontender, no hepatosplenomegaly, no masses and bowel sounds normal  MS: no gross musculoskeletal defects noted, no edema. Pain with resisted extension at wrist in lateral epicondyle region. No erythema or edema.  strength normal. No forearm tenderness.   SKIN: no suspicious lesions or rashes  NEURO: Normal strength and tone, mentation intact and speech normal  PSYCH: mentation appears normal, affect normal/bright        ASSESSMENT/PLAN:   (Z00.00) Routine general medical examination at a health care facility  (primary encounter diagnosis)  Comment:   Plan:     (N95.1) Menopausal syndrome (hot flashes)  Comment: discussed all risks including clots, breast cancer, etc. See patient instructions below for more plan.    Plan: TSH with free T4 reflex, estrogen         conj-medroxyPROGESTERone (PREMPRO) 0.3-1.5 MG         tablet, OFFICE/OUTPT VISIT,EST,LEVL IV            (M77.12) Lateral epicondylitis of left elbow  Comment:  See handout below and plan we went over  Given brace today to try as well  Plan: Physical Therapy Referral, OFFICE/OUTPT         VISIT,EST,LEVL IV            (Z13.1) Screening for diabetes mellitus  Comment:   Plan: Basic metabolic panel  (Ca, Cl, CO2, Creat,         Gluc, K, Na, BUN)            (Z13.220) Screening, lipid  Comment:   Plan: Lipid panel reflex to direct LDL Fasting          Billin min spent on patient today including chart review, history, exam, and explaining treatment  "plan and follow-up.       Patient has been advised of split billing requirements and indicates understanding: Yes    COUNSELING:  Reviewed preventive health counseling, as reflected in patient instructions       Regular exercise    Estimated body mass index is 23.53 kg/m  as calculated from the following:    Height as of 2/19/21: 1.778 m (5' 10\").    Weight as of 2/19/21: 74.4 kg (164 lb).        She reports that she has never smoked. She has never used smokeless tobacco.    Patient Instructions   Take ibuprofen 600 mg with food every 8 hours for 5 days then stop  Ice as needed  Wear brace as needed  Schedule with pt if not improving      Take prempro pill for hot flashes/pain with sex if this does not improve in a month please make a follow up appointment with OBGYN here at [facility]         Preventive Health Recommendations  Female Ages 50 - 64    Yearly exam: See your health care provider every year in order to  o Review health changes.   o Discuss preventive care.    o Review your medicines if your doctor has prescribed any.      Get a Pap test every three years (unless you have an abnormal result and your provider advises testing more often).    If you get Pap tests with HPV test, you only need to test every 5 years, unless you have an abnormal result.     You do not need a Pap test if your uterus was removed (hysterectomy) and you have not had cancer.    You should be tested each year for STDs (sexually transmitted diseases) if you're at risk.     Have a mammogram every 1 to 2 years.    Have a colonoscopy at age 50, or have a yearly FIT test (stool test). These exams screen for colon cancer.      Have a cholesterol test every 5 years, or more often if advised.    Have a diabetes test (fasting glucose) every three years. If you are at risk for diabetes, you should have this test more often.     If you are at risk for osteoporosis (brittle bone disease), think about having a bone density scan (DEXA).    Shots: Get " a flu shot each year. Get a tetanus shot every 10 years.    Nutrition:     Eat at least 5 servings of fruits and vegetables each day.    Eat whole-grain bread, whole-wheat pasta and brown rice instead of white grains and rice.    Get adequate Calcium and Vitamin D.     Lifestyle    Exercise at least 150 minutes a week (30 minutes a day, 5 days a week). This will help you control your weight and prevent disease.    Limit alcohol to one drink per day.    No smoking.     Wear sunscreen to prevent skin cancer.     See your dentist every six months for an exam and cleaning.    See your eye doctor every 1 to 2 years.        Counseling Resources:  ATP IV Guidelines  Pooled Cohorts Equation Calculator  Breast Cancer Risk Calculator  BRCA-Related Cancer Risk Assessment: FHS-7 Tool  FRAX Risk Assessment  ICSI Preventive Guidelines  Dietary Guidelines for Americans, 2010  USDA's MyPlate  ASA Prophylaxis  Lung CA Screening    KARLIE [facility]

## 2022-08-03 NOTE — PATIENT INSTRUCTIONS
Take ibuprofen 600 mg with food every 8 hours for 5 days then stop  Ice as needed  Wear brace as needed  Schedule with pt if not improving      Take prempro pill for hot flashes/pain with sex if this does not improve in a month please make a follow up appointment with OBGYN here at [place]         Preventive Health Recommendations  Female Ages 50 - 64    Yearly exam: See your health care provider every year in order to  Review health changes.   Discuss preventive care.    Review your medicines if your doctor has prescribed any.    Get a Pap test every three years (unless you have an abnormal result and your provider advises testing more often).  If you get Pap tests with HPV test, you only need to test every 5 years, unless you have an abnormal result.   You do not need a Pap test if your uterus was removed (hysterectomy) and you have not had cancer.  You should be tested each year for STDs (sexually transmitted diseases) if you're at risk.   Have a mammogram every 1 to 2 years.  Have a colonoscopy at age 50, or have a yearly FIT test (stool test). These exams screen for colon cancer.    Have a cholesterol test every 5 years, or more often if advised.  Have a diabetes test (fasting glucose) every three years. If you are at risk for diabetes, you should have this test more often.   If you are at risk for osteoporosis (brittle bone disease), think about having a bone density scan (DEXA).    Shots: Get a flu shot each year. Get a tetanus shot every 10 years.    Nutrition:   Eat at least 5 servings of fruits and vegetables each day.  Eat whole-grain bread, whole-wheat pasta and brown rice instead of white grains and rice.  Get adequate Calcium and Vitamin D.     Lifestyle  Exercise at least 150 minutes a week (30 minutes a day, 5 days a week). This will help you control your weight and prevent disease.  Limit alcohol to one drink per day.  No smoking.   Wear sunscreen to prevent skin cancer.   See your dentist every six  months for an exam and cleaning.  See your eye doctor every 1 to 2 years.

## 2022-08-09 ENCOUNTER — OFFICE VISIT (OUTPATIENT)
Dept: FAMILY MEDICINE | Facility: CLINIC | Age: 57
End: 2022-08-09
Payer: COMMERCIAL

## 2022-08-09 VITALS
DIASTOLIC BLOOD PRESSURE: 75 MMHG | TEMPERATURE: 97.5 F | SYSTOLIC BLOOD PRESSURE: 115 MMHG | RESPIRATION RATE: 16 BRPM | BODY MASS INDEX: 23.62 KG/M2 | WEIGHT: 165 LBS | OXYGEN SATURATION: 98 % | HEART RATE: 65 BPM | HEIGHT: 70 IN

## 2022-08-09 DIAGNOSIS — Z13.1 SCREENING FOR DIABETES MELLITUS: ICD-10-CM

## 2022-08-09 DIAGNOSIS — M77.12 LATERAL EPICONDYLITIS OF LEFT ELBOW: ICD-10-CM

## 2022-08-09 DIAGNOSIS — Z13.220 SCREENING, LIPID: ICD-10-CM

## 2022-08-09 DIAGNOSIS — Z00.00 ROUTINE GENERAL MEDICAL EXAMINATION AT A HEALTH CARE FACILITY: Primary | ICD-10-CM

## 2022-08-09 DIAGNOSIS — N95.1 MENOPAUSAL SYNDROME (HOT FLASHES): ICD-10-CM

## 2022-08-09 LAB
ANION GAP SERPL CALCULATED.3IONS-SCNC: 5 MMOL/L (ref 3–14)
BUN SERPL-MCNC: 19 MG/DL (ref 7–30)
CALCIUM SERPL-MCNC: 9.3 MG/DL (ref 8.5–10.1)
CHLORIDE BLD-SCNC: 111 MMOL/L (ref 94–109)
CHOLEST SERPL-MCNC: 185 MG/DL
CO2 SERPL-SCNC: 26 MMOL/L (ref 20–32)
CREAT SERPL-MCNC: 0.74 MG/DL (ref 0.52–1.04)
FASTING STATUS PATIENT QL REPORTED: YES
GFR SERPL CREATININE-BSD FRML MDRD: >90 ML/MIN/1.73M2
GLUCOSE BLD-MCNC: 82 MG/DL (ref 70–99)
HDLC SERPL-MCNC: 51 MG/DL
LDLC SERPL CALC-MCNC: 118 MG/DL
NONHDLC SERPL-MCNC: 134 MG/DL
POTASSIUM BLD-SCNC: 4 MMOL/L (ref 3.4–5.3)
SODIUM SERPL-SCNC: 142 MMOL/L (ref 133–144)
TRIGL SERPL-MCNC: 81 MG/DL
TSH SERPL DL<=0.005 MIU/L-ACNC: 0.94 MU/L (ref 0.4–4)

## 2022-08-09 PROCEDURE — 99213 OFFICE O/P EST LOW 20 MIN: CPT | Mod: 25 | Performed by: PHYSICIAN ASSISTANT

## 2022-08-09 PROCEDURE — 84443 ASSAY THYROID STIM HORMONE: CPT | Performed by: PHYSICIAN ASSISTANT

## 2022-08-09 PROCEDURE — 99396 PREV VISIT EST AGE 40-64: CPT | Performed by: PHYSICIAN ASSISTANT

## 2022-08-09 PROCEDURE — 36415 COLL VENOUS BLD VENIPUNCTURE: CPT | Performed by: PHYSICIAN ASSISTANT

## 2022-08-09 PROCEDURE — 80048 BASIC METABOLIC PNL TOTAL CA: CPT | Performed by: PHYSICIAN ASSISTANT

## 2022-08-09 PROCEDURE — 80061 LIPID PANEL: CPT | Performed by: PHYSICIAN ASSISTANT

## 2022-08-10 ENCOUNTER — TELEPHONE (OUTPATIENT)
Dept: FAMILY MEDICINE | Facility: CLINIC | Age: 57
End: 2022-08-10

## 2022-08-10 ENCOUNTER — MYC MEDICAL ADVICE (OUTPATIENT)
Dept: FAMILY MEDICINE | Facility: CLINIC | Age: 57
End: 2022-08-10

## 2022-08-10 DIAGNOSIS — N95.1 MENOPAUSAL SYNDROME (HOT FLASHES): Primary | ICD-10-CM

## 2022-08-10 DIAGNOSIS — M77.12 LATERAL EPICONDYLITIS OF LEFT ELBOW: Primary | ICD-10-CM

## 2022-08-10 NOTE — TELEPHONE ENCOUNTER
Central Prior Authorization Team   Phone: [phone]      PA Initiation    Medication: Prempro 0.3-1.5MG Tablets  Insurance Company: [employer] [facility] - Phone [phone] Fax [phone]  Pharmacy Filling the Rx: [facility] [address] [place] [address] - [address]  Filling Pharmacy Phone: [phone]  Filling Pharmacy Fax:    Start Date: 8/10/2022

## 2022-08-10 NOTE — TELEPHONE ENCOUNTER
Prior Authorization Retail Medication Request    Medication/Dose: Prempro 0.3-1.5MG Tablets  ICD code (if different than what is on RX):  Menopausal syndrome (hot flashes)   Previously Tried and Failed:    Rationale:      Insurance Name:    Insurance ID:        Pharmacy Information (if different than what is on RX)  Name:  [facility]  Phone:  [facility] [id number]

## 2022-08-11 RX ORDER — ESTRADIOL AND NORETHINDRONE ACETATE 1; .5 MG/1; MG/1
1 TABLET ORAL DAILY
Status: CANCELLED | OUTPATIENT
Start: 2022-08-11

## 2022-08-11 NOTE — TELEPHONE ENCOUNTER
PRIOR AUTHORIZATION DENIED    Medication: Prempro 0.3-1.5MG Tablets-DENIED    Denial Date: 8/10/2022    Denial Rational: Patient must have a history of trial & failure to at least 3 of the formulary alternative(s) or have a contraindication or intolerance to the formulary alternatives:              Appeal Information:     If provider would like to appeal please provide a letter of medical necessity stating why formulary alternatives would not be clinically appropriate for patient and route back to the PA team.

## 2022-08-11 NOTE — RESULT ENCOUNTER NOTE
Vianca Nair,       Your recent test results are attached, if you have any questions or concerns please feel free to contact me via e-mail or call [phone].  Thyroid normal. Cholesterol stable. Sodium and potassium normal. Blood sugar (glucose) normal.  Creatinine and GFR normal, which means kidney function is normal.            It was a pleasure to see you at your recent office visit.      Sincerely,  Bryanna Winter PA-C

## 2022-08-12 RX ORDER — ESTRADIOL 1 MG/1
1 TABLET ORAL DAILY
Qty: 90 TABLET | Refills: 3 | Status: SHIPPED | OUTPATIENT
Start: 2022-08-12 | End: 2023-09-05

## 2022-08-12 RX ORDER — PROGESTERONE 100 MG/1
100 CAPSULE ORAL DAILY
Qty: 90 CAPSULE | Refills: 3 | Status: SHIPPED | OUTPATIENT
Start: 2022-08-12 | End: 2023-09-05

## 2022-08-12 NOTE — TELEPHONE ENCOUNTER
I sent over alternative, it is two separate Rx important to take both of them together only estrogen not safe without progesterone.     Bryanna Winter PA-C

## 2022-08-15 NOTE — TELEPHONE ENCOUNTER
Hello, yes I sent a message but must have not gone through. I sent over estrogen and progesterone separate prescriptions but to take together, if this isn't covered let me know. It is important she not take just one or the other they have to be taken together (both the estrogen and progesterone).  I already sent that to pharmacy before today so hopefully she can call them to check on status.     Bryanna Winter PA-C

## 2022-08-29 ENCOUNTER — TRANSFERRED RECORDS (OUTPATIENT)
Dept: HEALTH INFORMATION MANAGEMENT | Facility: CLINIC | Age: 57
End: 2022-08-29

## 2022-11-19 ENCOUNTER — HEALTH MAINTENANCE LETTER (OUTPATIENT)
Age: 57
End: 2022-11-19

## 2023-06-28 ENCOUNTER — TELEPHONE (OUTPATIENT)
Dept: FAMILY MEDICINE | Facility: CLINIC | Age: 58
End: 2023-06-28
Payer: COMMERCIAL

## 2023-06-28 NOTE — TELEPHONE ENCOUNTER
Patient was calling to see if she could get an RX for estradiol. Bryanna Winter told her about this when she was in last year. I told her she will need to establish care with a new provider to ask about this. Bryanna is no longer here and it has been 1 year since she has been seen. She will call back and set up an appointment at [facility]. She needs to see who is in network for her insurance.Chayo Rios MA/TC

## 2023-07-20 ENCOUNTER — TELEPHONE (OUTPATIENT)
Dept: FAMILY MEDICINE | Facility: CLINIC | Age: 58
End: 2023-07-20

## 2023-07-20 ENCOUNTER — OFFICE VISIT (OUTPATIENT)
Dept: FAMILY MEDICINE | Facility: CLINIC | Age: 58
End: 2023-07-20
Payer: COMMERCIAL

## 2023-07-20 VITALS
SYSTOLIC BLOOD PRESSURE: 116 MMHG | OXYGEN SATURATION: 99 % | HEIGHT: 70 IN | HEART RATE: 59 BPM | WEIGHT: 169.7 LBS | TEMPERATURE: 98.2 F | RESPIRATION RATE: 14 BRPM | DIASTOLIC BLOOD PRESSURE: 74 MMHG | BODY MASS INDEX: 24.29 KG/M2

## 2023-07-20 DIAGNOSIS — N94.10 DYSPAREUNIA IN FEMALE: Primary | ICD-10-CM

## 2023-07-20 PROCEDURE — 99213 OFFICE O/P EST LOW 20 MIN: CPT | Performed by: STUDENT IN AN ORGANIZED HEALTH CARE EDUCATION/TRAINING PROGRAM

## 2023-07-20 ASSESSMENT — PAIN SCALES - GENERAL: PAINLEVEL: NO PAIN (0)

## 2023-07-20 NOTE — PROGRESS NOTES
1. Dyspareunia in female  > suspect secondary to vaginal dryness from menopause   - can trial conjugated estrogens (PREMARIN) 0.625 MG/GM vaginal cream; Ok to use 0.5 g twice weekly  Dispense: 30 g; Refill: 1    2. BMI 24.0-24.9, adult  > patient is aware her BMI is within healthy ranges   - encouraged cardiovascular exercises such as walking and biking (patient likes to go mountain biking on her E-bike)   - recommended limiting carbohydrate intake to decrease risk for pre diabetes/diabetes     Follow up plan:   - patient aware to make a follow up visit in 6 weeks or so, if Premarin doesn't alleviate/improve her symptoms she will benefit from a pelvic exam to assess for alternative source of dyspareunia     Mariana Nair is a 58 year old, presenting for the following health issues:  Medication Request (Would like to discuss getting Estradial for painful intercourse, has tried other OTC products with out relief ) and Menopausal Sx (Painful intercourse and trouble losing weight/)        7/20/2023    11:10 AM   Additional Questions   Roomed by Melina ALVES LPN   Accompanied by self         7/20/2023    11:10 AM   Patient Reported Additional Medications   Patient reports taking the following new medications no new meds     History of Present Illness       Reason for visit:  Dryness in vagina  Symptom onset:  More than a month  Symptoms include:  Soreness when having intercorse  Symptom intensity:  Severe  Symptom progression:  Staying the same  Had these symptoms before:  Yes  Has tried/received treatment for these symptoms:  No  What makes it better:  OTC Gels but they do not help    She eats 2-3 servings of fruits and vegetables daily.She consumes 2 sweetened beverage(s) daily.She exercises with enough effort to increase her heart rate 20 to 29 minutes per day.  She exercises with enough effort to increase her heart rate 5 days per week.   She is taking medications regularly.     Chief Complaint   Patient presents  "with    Medication Request     Would like to discuss getting Estradial for painful intercourse, has tried other OTC products with out relief     Menopausal Sx     Painful intercourse and trouble losing weight           Review of Systems   As above         Objective    /74 (BP Location: Right arm, Patient Position: Sitting, Cuff Size: Adult Regular)   Pulse 59   Temp 98.2  F (36.8  C) (Tympanic)   Resp 14   Ht 1.772 m (5' 9.76\")   Wt 77 kg (169 lb 11.2 oz)   LMP 06/16/2014 (Approximate)   SpO2 99%   BMI 24.51 kg/m    Body mass index is 24.51 kg/m .  Physical Exam  Constitutional:       General: She is not in acute distress.     Appearance: Normal appearance.   HENT:      Head: Normocephalic and atraumatic.      Right Ear: External ear normal.      Left Ear: External ear normal.      Mouth/Throat:      Mouth: Mucous membranes are moist.      Pharynx: Oropharynx is clear. No oropharyngeal exudate or posterior oropharyngeal erythema.   Eyes:      General: No scleral icterus.        Right eye: No discharge.         Left eye: No discharge.      Extraocular Movements: Extraocular movements intact.      Conjunctiva/sclera: Conjunctivae normal.   Cardiovascular:      Rate and Rhythm: Normal rate and regular rhythm.      Heart sounds: Normal heart sounds.   Pulmonary:      Effort: Pulmonary effort is normal. No respiratory distress.      Breath sounds: Normal breath sounds. No wheezing or rhonchi.   Abdominal:      Palpations: Abdomen is soft. There is no mass.      Tenderness: There is no abdominal tenderness.   Musculoskeletal:         General: No deformity. Normal range of motion.      Cervical back: Normal range of motion and neck supple.   Skin:     General: Skin is warm.      Findings: No rash.      Comments: No rash on exposed skin   Neurological:      General: No focal deficit present.      Mental Status: She is alert and oriented to person, place, and time.   Psychiatric:         Mood and Affect: Mood " normal.         Behavior: Behavior normal.

## 2023-07-20 NOTE — TELEPHONE ENCOUNTER
Prior Authorization Retail Medication Request    Medication/Dose: Premarin 0.625 mg cream  ICD code (if different than what is on RX):    Previously Tried and Failed:    Rationale:      Insurance Name:  not provided  Insurance ID:  not provided  [facility] [place]: [id number]      Pharmacy Information (if different than what is on RX)  Name:    Phone:

## 2023-07-26 NOTE — TELEPHONE ENCOUNTER
PRIOR AUTHORIZATION DENIED    Medication: Premarin 0.625 mg cream    Denial Date: 7/26/2023    Denial Rational:              Appeal Information:    If you would like to appeal, please supply P/A team with a letter of medical necessity with clinical reason.

## 2023-07-26 NOTE — TELEPHONE ENCOUNTER
Central Prior Authorization Team   Phone: [phone]    PA Initiation    Medication: Premarin 0.625 mg cream  Insurance Company: [facility] - Phone [phone] Fax [phone]  Pharmacy Filling the Rx: [facility] [address] [place] [address] - [facility] [address]  Filling Pharmacy Phone: [phone]  Filling Pharmacy Fax:    Start Date: 7/26/2023

## 2023-07-27 DIAGNOSIS — N94.10 DYSPAREUNIA IN FEMALE: Primary | ICD-10-CM

## 2023-07-27 RX ORDER — ESTRADIOL 0.1 MG/G
CREAM VAGINAL
Qty: 42.5 G | Refills: 1 | Status: SHIPPED | OUTPATIENT
Start: 2023-07-27

## 2023-07-27 NOTE — TELEPHONE ENCOUNTER
DEE Castanon for premarin cream has been denied.    Patient needs to have tried and failed three formulary alternatives and she has tried one:        Breana Olsen

## 2023-09-05 ENCOUNTER — OFFICE VISIT (OUTPATIENT)
Dept: FAMILY MEDICINE | Facility: CLINIC | Age: 58
End: 2023-09-05
Payer: COMMERCIAL

## 2023-09-05 ENCOUNTER — ANCILLARY PROCEDURE (OUTPATIENT)
Dept: GENERAL RADIOLOGY | Facility: CLINIC | Age: 58
End: 2023-09-05
Attending: STUDENT IN AN ORGANIZED HEALTH CARE EDUCATION/TRAINING PROGRAM
Payer: COMMERCIAL

## 2023-09-05 VITALS
HEART RATE: 66 BPM | DIASTOLIC BLOOD PRESSURE: 78 MMHG | HEIGHT: 69 IN | OXYGEN SATURATION: 99 % | TEMPERATURE: 98.8 F | SYSTOLIC BLOOD PRESSURE: 124 MMHG | BODY MASS INDEX: 24.63 KG/M2 | RESPIRATION RATE: 16 BRPM | WEIGHT: 166.3 LBS

## 2023-09-05 DIAGNOSIS — S99.921A TOE INJURY, RIGHT, INITIAL ENCOUNTER: ICD-10-CM

## 2023-09-05 DIAGNOSIS — S99.921A TOE INJURY, RIGHT, INITIAL ENCOUNTER: Primary | ICD-10-CM

## 2023-09-05 DIAGNOSIS — S92.514A NONDISPLACED FRACTURE OF PROXIMAL PHALANX OF RIGHT LESSER TOE(S), INITIAL ENCOUNTER FOR CLOSED FRACTURE: ICD-10-CM

## 2023-09-05 DIAGNOSIS — N94.10 DYSPAREUNIA IN FEMALE: Primary | ICD-10-CM

## 2023-09-05 LAB
CLUE CELLS: ABNORMAL
TRICHOMONAS, WET PREP: ABNORMAL
WBC'S/HIGH POWER FIELD, WET PREP: ABNORMAL
YEAST, WET PREP: ABNORMAL

## 2023-09-05 PROCEDURE — 73660 X-RAY EXAM OF TOE(S): CPT | Mod: TC | Performed by: RADIOLOGY

## 2023-09-05 PROCEDURE — 87210 SMEAR WET MOUNT SALINE/INK: CPT | Performed by: STUDENT IN AN ORGANIZED HEALTH CARE EDUCATION/TRAINING PROGRAM

## 2023-09-05 PROCEDURE — 99214 OFFICE O/P EST MOD 30 MIN: CPT | Performed by: STUDENT IN AN ORGANIZED HEALTH CARE EDUCATION/TRAINING PROGRAM

## 2023-09-05 ASSESSMENT — PAIN SCALES - GENERAL: PAINLEVEL: EXTREME PAIN (8)

## 2023-09-05 NOTE — RESULT ENCOUNTER NOTE
Vianca Coe,     It is a pleasure providing you with medical care. I have received and reviewed your lab results, and have the following recommendations:     Based on the results of your wet prep, you were not found to have a yeast infection, please make sure you follow-up with OB/GYN regarding additional work-up or potential explanations for your symptoms.    Sincerely,     Zuleika Scott MD

## 2023-09-05 NOTE — PROGRESS NOTES
"  1. Dyspareunia in female  > Based on physical exam findings, there is a clinical suspicion for candidal infection  > Since patient has not really noticed an improvement in pain with intercourse after being on estradiol cream, and given her vaginal discharge, I informed patient that she can hold off on estradiol cream at this time  - Wet prep - lab collect  - Ob/Gyn Referral; Future, if the wet prep comes back positive for yeast infection, informed patient that she can take the antifungal therapy and if her symptoms resolve she can cancel her appointment with OB/GYN    2. Toe injury, right, initial encounter  > Patient did sustain an injury to her right pinky toe by hitting it against a stool a few weeks ago, since that time it has been swollen hence mildly tender to palpation  - XR Toe Right G/E 2 Views; Future      Subjective   Joanie is a 58 year old, presenting for the following health issues:  RECHECK (Vaginal cream) and Toe Injury (Toe pain x 3 weeks ago)        9/5/2023     2:10 PM   Additional Questions   Roomed by rmb   Accompanied by self         9/5/2023     2:10 PM   Patient Reported Additional Medications   Patient reports taking the following new medications none       History of Present Illness       Reason for visit:  Follow up for my condition at my last appointment with prescription    She eats 2-3 servings of fruits and vegetables daily.She consumes 0 sweetened beverage(s) daily.She exercises with enough effort to increase her heart rate 30 to 60 minutes per day.  She exercises with enough effort to increase her heart rate 5 days per week.   She is taking medications regularly.       Still experiencing discomfort with intercourse described as stinging in nature \"like if you rub your skin really hard and it kind of stings after that\"     Patient not entirely convinced that symptoms improved with estradiol cream   Has been taking it twice weekly for the past 3 weeks   Denies any side effects from the " estradiol cream   No fevers, chills nausea, vomiting, vaginal bleeding/discharge     Toe pain:   Onset: 3 weeks ago   Walked into a stool and rammed her toe   Therapies tried: limiting exercising, has been walking   Worsening factors include biking, walking   Radiation: pain goes up to the ankle   Characteristics: throbbing, but depending on the movement the pain can be shaper     Review of Systems   As above       Objective    LMP 06/16/2014 (Approximate)   There is no height or weight on file to calculate BMI.  Physical Exam  Constitutional:       General: She is not in acute distress.     Appearance: Normal appearance.   HENT:      Head: Normocephalic and atraumatic.      Right Ear: External ear normal.      Left Ear: External ear normal.   Eyes:      General: No scleral icterus.        Right eye: No discharge.         Left eye: No discharge.      Extraocular Movements: Extraocular movements intact.      Conjunctiva/sclera: Conjunctivae normal.   Pulmonary:      Effort: Pulmonary effort is normal. No respiratory distress.   Genitourinary:     Exam position: Lithotomy position.      Labia:         Right: No rash, tenderness, lesion or injury.         Left: No rash, tenderness, lesion or injury.       Vagina: No signs of injury and foreign body. Vaginal discharge present. No bleeding or lesions.      Cervix: No friability, lesion, erythema or cervical bleeding.      Comments: Patient with thick white chunky discharge   Vaginal area did not appear atrophied or dry   Musculoskeletal:         General: No deformity. Normal range of motion.      Cervical back: Normal range of motion and neck supple.   Skin:     General: Skin is warm.      Comments: No rash on exposed skin   Neurological:      General: No focal deficit present.      Mental Status: She is alert and oriented to person, place, and time.   Psychiatric:         Mood and Affect: Mood normal.         Behavior: Behavior normal.

## 2023-09-05 NOTE — RESULT ENCOUNTER NOTE
Vianca Coe,     It is a pleasure providing you with medical care. I have received and reviewed your x-ray  results, and have the following recommendations:       Looking at the results of your x-ray, you did have a fracture of your right small toe.  Given the location of the fracture, I will be sending a referral over to the podiatrist/orthopedic doctor to see if any additional interventions are required.    Sincerely,     Zuleika Scott MD

## 2023-09-06 ENCOUNTER — TELEPHONE (OUTPATIENT)
Dept: PODIATRY | Facility: CLINIC | Age: 58
End: 2023-09-06

## 2023-09-06 NOTE — TELEPHONE ENCOUNTER
[facility] Call Center    Phone Message    May a detailed message be left on voicemail: yes     Reason for Call: Other: Toe injury, right, nondisplaced referral. Patient is scheduled to see [place] on 09/07 at 10:45a     Action Taken: Other: routed to [facility] [facility] Triage    Travel Screening: Not Applicable

## 2023-09-07 ENCOUNTER — OFFICE VISIT (OUTPATIENT)
Dept: PODIATRY | Facility: CLINIC | Age: 58
End: 2023-09-07
Payer: COMMERCIAL

## 2023-09-07 VITALS
HEART RATE: 55 BPM | BODY MASS INDEX: 24.59 KG/M2 | HEIGHT: 69 IN | DIASTOLIC BLOOD PRESSURE: 80 MMHG | SYSTOLIC BLOOD PRESSURE: 123 MMHG | WEIGHT: 166 LBS

## 2023-09-07 DIAGNOSIS — S92.501A: Primary | ICD-10-CM

## 2023-09-07 PROCEDURE — 99203 OFFICE O/P NEW LOW 30 MIN: CPT | Performed by: PODIATRIST

## 2023-09-07 ASSESSMENT — PAIN SCALES - GENERAL: PAINLEVEL: EXTREME PAIN (8)

## 2023-09-07 NOTE — Clinical Note
9/7/2023         RE: Joanie Coe  [address] [address]        Dear Colleague,    Thank you for referring your patient, Joanie Coe, to the [facility] [place]. Please see a copy of my visit note below.    PATIENT HISTORY:  Joanie Coe is a 58 year old female who presents to clinic with a chief complaint of a painful right foot.  The patient relates the pain is located over the fifth digit on the right foot.  The patient relates injuring the foot 3 weeks ago while at home. Patient states she hit her toe on a bar stool.  The patient was seen by *** with x-rays revealing ***.  The patient was offloaded with ***.  The patient was instructed to follow up in my clinic for further evaluation and treatment options.    Medical, surgical and family history was reviewed in the chart with noted significant findings of ***    Vitals: [facility] 06/16/2014 (Approximate)   BMI= There is no height or weight on file to calculate BMI.    LOWER EXTREMITY PHYSICAL EXAM    Dermatologic: Skin is intact to {RIGHT /LEFT:105364} lower extremities without significant lesions, rash or abrasion.        Vascular: DP & PT pulses are intact & regular on the {RIGHT /LEFT:735483}.   CFT and skin temperature is normal to the {RIGHT /LEFT:171577} lower extremities.     Neurologic: Lower extremity sensation is intact to light touch.  No evidence of weakness in the {RIGHT /LEFT:772293} lower extremities.        Musculoskeletal: Patient is ambulatory without assistive device or brace.  No gross ankle deformity noted.  No foot or ankle joint effusion is noted.  Noted ***    Diagnostics: Radiographs were evaluated including non-weightbearing AP, lateral and medial oblique views of the {RIGHT LEFT BOTH NO:854662} foot reveals *** no cortical erosions or periosteal elevation.  All joint margins appear stable.  There is no apparent tumor formation noted.  There is no evidence of foreign body.  The images were  reviewed with the patient explaining the findings.      ASSESSMENT / PLAN:   No diagnosis found.    I have explained to Joanie about the conditions.  We discussed the underlying contributing factors of the condition as well as the treatment plan and expected length of recovery.  At this time, ***    Joanie verbalized agreement with and understanding of the rational for the diagnosis and treatment plan.  All questions were answered to best of my ability and the patient's satisfaction. The patient was advised to contact the clinic with any questions that may arise after the clinic visit.      Disclaimer: This note consists of symbols derived from keyboarding, dictation and/or voice recognition software. As a result, there may be errors in the script that have gone undetected. Please consider this when interpreting information found in this chart.       DENNY Rivera.P.JAHAIRA., F.A.C.F.A.S.        Again, thank you for allowing me to participate in the care of your patient.        Sincerely,        [place] DPM

## 2023-09-07 NOTE — NURSING NOTE
"Chief Complaint   Patient presents with    Fracture     Right fifth digit injury       Initial /80   Pulse 55   Ht 1.762 m (5' 9.37\")   Wt 75.3 kg (166 lb)   LMP 06/16/2014 (Approximate)   BMI 24.25 kg/m   Estimated body mass index is 24.25 kg/m  as calculated from the following:    Height as of this encounter: 1.762 m (5' 9.37\").    Weight as of this encounter: 75.3 kg (166 lb).  Medications and allergies reviewed.      Felicia BALDWIN MA    "

## 2023-09-07 NOTE — PROGRESS NOTES
"PATIENT HISTORY:  Joanie Coe is a 58 year old female who presents to clinic with a chief complaint of a painful right foot.  The patient relates the pain is located over the fifth digit on the right foot.  The patient relates injuring the foot 3 weeks ago while at home. Patient states she hit her toe on a bar stool.  The patient was seen by Family Practice with x-rays revealing a fractured fifth toe on the right foot.  The patient had the right foot offloaded.  The patient was instructed to follow up in my clinic for further evaluation and treatment options.    Medical, surgical and family history was reviewed in the chart.    Vitals: /80   Pulse 55   Ht 1.762 m (5' 9.37\")   Wt 75.3 kg (166 lb)   LMP 06/16/2014 (Approximate)   BMI 24.25 kg/m    BMI= Body mass index is 24.25 kg/m .    LOWER EXTREMITY PHYSICAL EXAM    Dermatologic: Skin is intact to right lower extremities without significant lesions, rash or abrasion.        Vascular: DP & PT pulses are intact & regular on the right.   CFT and skin temperature is normal to the right lower extremities.     Neurologic: Lower extremity sensation is intact to light touch.  No evidence of weakness in the right lower extremities.        Musculoskeletal: Patient is ambulatory without assistive device or brace.  No gross ankle deformity noted.  No foot or ankle joint effusion is noted.  Noted pain on palpation over the fifth toe on the right foot.  The toe appears in a normal anatomical position.  Mild edema noted.    Diagnostics: Radiographs were evaluated including non-weightbearing AP, lateral and medial oblique views of the right forefoot reveals a minimally displaced fifth toe fracture with no cortical erosions or periosteal elevation.  All joint margins appear stable.  There is no apparent tumor formation noted.  There is no evidence of foreign body.  The images were reviewed with the patient explaining the findings.      ASSESSMENT / PLAN:     ICD-10-CM  "   1. Closed displaced fracture of phalanx of lesser toe of right foot, initial encounter  S92.501A     fifth toe          I have explained to Joanie about the conditions.  We discussed the underlying contributing factors of the condition as well as the treatment plan and expected length of recovery.  At this time, the patient will not need surgery to fix the fracture.  The patient was instructed to continue offloading the right foot to allow the fracture to heal.  The patient may return in one month for reevaluation and repeat x-rays.    Joanie verbalized agreement with and understanding of the rational for the diagnosis and treatment plan.  All questions were answered to best of my ability and the patient's satisfaction. The patient was advised to contact the clinic with any questions that may arise after the clinic visit.      Disclaimer: This note consists of symbols derived from keyboarding, dictation and/or voice recognition software. As a result, there may be errors in the script that have gone undetected. Please consider this when interpreting information found in this chart.       KATHRYN Preston D.P.M., F.FRANKIE.C.F.A.S.

## 2023-09-07 NOTE — PATIENT INSTRUCTIONS
TOE & METATARSAL FRACTURES  The structure of the foot is complex, consisting of bones, muscles, tendons, and other soft tissues. Of the 26 bones in the foot, 19 are toe bones (phalanges) and metatarsal bones (the long bones in the midfoot). Fractures of the toe and metatarsal bones are common and require evaluation by a specialist. A foot and ankle surgeon should be seen for proper diagnosis and treatment, even if initial treatment has been received in an emergency room.  A fracture is a break in the bone. Fractures can be divided into two categories: traumatic fractures and stress fractures.  TRAUMATIC FRACTURES (also called acute fractures) are caused by a direct blow or impact, such as seriously stubbing your toe. Traumatic fractures can be displaced or non-displaced. If the fracture is displaced, the bone is broken in such a way that it has changed in position (dislocated).  Signs and symptoms of a traumatic fracture include:  You may hear a sound at the time of the break.    Pinpoint pain  (pain at the place of impact) at the time the fracture occurs and perhaps for a few hours later, but often the pain goes away after several hours.   Crooked or abnormal appearance of the toe.   Bruising and swelling the next day.   It is not true that  if you can walk on it, it s not broken.  Evaluation by a foot and ankle surgeon is always recommended.   STRESS FRACTURES are tiny, hairline breaks that are usually caused by repetitive stress. Stress fractures often afflict athletes who, for example, too rapidly increase their running mileage. They can also be caused by an abnormal foot structure, deformities, or osteoporosis. Improper footwear may also lead to stress fractures. Stress fractures should not be ignored. They require proper medical attention to heal correctly.  Symptoms of stress fractures include:  Pain with or after normal activity   Pain that goes away when resting and then returns when standing or during  activity    Pinpoint pain  (pain at the site of the fracture) when touched   Swelling, but no bruising   IMPROPER TREATMENT  Some people say that  the doctor can t do anything for a broken bone in the foot.  This is usually not true. In fact, if a fractured toe or metatarsal bone is not treated correctly, serious complications may develop. For example:  A deformity in the bony architecture which may limit the ability to move the foot or cause difficulty in fitting shoes   Arthritis, which may be caused by a fracture in a joint (the juncture where two bones meet), or may be a result of angular deformities that develop when a displaced fracture is severe or hasn t been properly corrected   Chronic pain and deformity   Non-union, or failure to heal, can lead to subsequent surgery or chronic pain.   PROPER TREATMENT FOR TOES  Fractures of the toe bones are almost always traumatic fractures. Treatment for traumatic fractures depends on the break itself and may include these options:  Rest. Sometimes rest is all that is needed to treat a traumatic fracture of the toe.   Splinting. The toe may be fitted with a splint to keep it in a fixed position.   Rigid or stiff-soled shoe. Wearing a stiff-soled shoe protects the toe and helps keep it properly positioned.    Lasha taping  the fractured toe to another toe is sometimes appropriate, but in other cases it may be harmful.   Surgery. If the break is badly displaced or if the joint is affected, surgery may be necessary. Surgery often involves the use of fixation devices, such as pins.   PROPER TREATMENT OF METATARSALS  Breaks in the metatarsal bones may be either stress or traumatic fractures. Certain kinds of fractures of the metatarsal bones present unique challenges.  For example, sometimes a fracture of the first metatarsal bone (behind the big toe) can lead to arthritis. Since the big toe is used so frequently and bears more weight than other toes, arthritis in that area  can make it painful to walk, bend, or even stand.  Another type of break, called a Vargas fracture, occurs at the base of the fifth metatarsal bone (behind the little toe). It is often misdiagnosed as an ankle sprain, and misdiagnosis can have serious consequences since sprains and fractures require different treatments. Your foot and ankle surgeon is an expert in correctly identifying these conditions as well as other problems of the foot.  Treatment of metatarsal fractures depends on the type and extent of the fracture, and may include:  Rest. Sometimes rest is the only treatment needed to promote healing of a stress or traumatic fracture of a metatarsal bone.   Avoid the offending activity. Because stress fractures result from repetitive stress, it is important to avoid the activity that led to the fracture. Crutches or a wheelchair are sometimes required to offload weight from the foot to give it time to heal.   Immobilization, casting, or rigid shoe. A stiff-soled shoe or other form of immobilization may be used to protect the fractured bone while it is healing.   Surgery. Some traumatic fractures of the metatarsal bones require surgery, especially if the break is badly displaced.   Follow-up care. Your foot and ankle surgeon will provide instructions for care following surgical or non-surgical treatment. Physical therapy, exercises and rehabilitation may be included in a schedule for return to normal activities.

## 2023-09-10 ENCOUNTER — HEALTH MAINTENANCE LETTER (OUTPATIENT)
Age: 58
End: 2023-09-10

## 2023-09-15 ENCOUNTER — OFFICE VISIT (OUTPATIENT)
Dept: OBGYN | Facility: CLINIC | Age: 58
End: 2023-09-15
Attending: STUDENT IN AN ORGANIZED HEALTH CARE EDUCATION/TRAINING PROGRAM
Payer: COMMERCIAL

## 2023-09-15 VITALS
RESPIRATION RATE: 16 BRPM | SYSTOLIC BLOOD PRESSURE: 116 MMHG | DIASTOLIC BLOOD PRESSURE: 77 MMHG | WEIGHT: 164 LBS | HEIGHT: 69 IN | HEART RATE: 60 BPM | BODY MASS INDEX: 24.29 KG/M2 | TEMPERATURE: 97.4 F

## 2023-09-15 DIAGNOSIS — N95.2 VAGINAL ATROPHY: Primary | ICD-10-CM

## 2023-09-15 DIAGNOSIS — N94.10 DYSPAREUNIA IN FEMALE: ICD-10-CM

## 2023-09-15 PROCEDURE — 99203 OFFICE O/P NEW LOW 30 MIN: CPT | Performed by: STUDENT IN AN ORGANIZED HEALTH CARE EDUCATION/TRAINING PROGRAM

## 2023-09-15 RX ORDER — LORATADINE 10 MG/1
10 TABLET, ORALLY DISINTEGRATING ORAL DAILY
COMMUNITY

## 2023-09-15 NOTE — PROGRESS NOTES
[facility]  Gynecology Office Note    Assessment and Plan: Anu RUFUS Coe, 58 year old , presents for dyspareunia.    Dyspareunia  -Quality and description of pain most consistent with a superficial cause as opposed to deep dyspareunia (adnexal mass, leiomyoma, malignancy). No signs/sx of infx to suggest infection or PID. Burning quality, diffuse vaginal location, pain only with intercourse, and harlan-menopausal onset is most consistent with genitourinary syndrome of menopause with vaginal tissue changes/reduction in lubrication i/s/o menopausal low-estrogen. Has used less/no vaginal estrogen the past month. Physical exam revealed atrophic vagina and right adnexal tenderness. Presentation less consistent with other causes of dyspareunia, like vaginismus, myofascial pelvic pain syndrome, pudendal neuralgia.  - Discussed dyspareunia most likely related to decreased estrogen. Advised upping vaginal estrogen to daily for a month or until symptoms improve, then gradually taper back to eventually reach a maintenance dose of 1-2/wk. We reviewed her application and I suggested placing the product on her finger to apply internally so she can better control/feel where it is going as opposed to using the applicator  - Discussed lubrication with a recommendation to use oil-based products, like coconut oil. If water lubrication is used, it will have to be reapplied frequently  - Discussed importance of physiologic arousal to decreasing intercourse pain d/t the physiologic response of dilation and increased blood flow    Right adnexal tenderness on exam  -Pelvic US ordered to rule out adnexal pathology.    Health maintenance: Pap next due 2026    Regina Tapia MD  Obstetrics and Gynecology  [facility]   09/15/2023     -----------------------------------------------------------------------------------------------------------------------------------    HPI: Joanie Coe, 58 year  old , presents for dyspareunia.    She reports ongoing sharp vaginal pains with penetration and vaginal dryness. No sensation that something inserted is unusually pushing against internal anatomy. Had used topical estrogen cream daily for 2 weeks starting in 2023, then 2x/week, and most recently had it discontinued altogether by PCP for past 1~2 weeks. No other forms of HRT. Currently uses oil-based lubricant (?) intermittently with her partner. Has previously tried coconut oil without relief. Denies vaginal discharge, irritation, and bleeding. Hot flashes are tapering down and are well-controlled using a fan at night. Denies urinary frequency, urgency, dysuria.    ROS: A 10 pt ROS was completed and found to be otherwise negative unless mentioned in the HPI.     OBHx: .  x2 for twin. Denid GDM, gHTN, PPH.    GYN Hx:   Patient's last menstrual period was 2014 (approximate).  Last Pap Smear: 2021 NILM w/ neg HPV  Abnormal Pap Smears: Denied  Sexual Activity: currently sexually active exclusively with   Sexually Transmitted Infections: Denied and declined screening  Denies any hx of ovarian cysts, uterine fibroids or polyps.    PMH: healthy. Denied other medical conditions  PSHx: Diagnostic Laparoscopy while trying to get pregnant. Amniocentesis. Ankle surgery. Colonoscopy  Medications:   loratadine (CLARITIN REDITABS) 10 MG ODT, Take 10 mg by mouth daily  Omega-3 Fatty Acids (FISH OIL CONCENTRATE PO),   conjugated estrogens (PREMARIN) 0.625 MG/GM vaginal cream, Ok to use 0.5 g twice weekly (Patient not taking: Reported on 9/15/2023)  estradiol (ESTRACE) 0.1 MG/GM vaginal cream, 500 mg to 1 g/day intravaginally for 2 weeks, then 500 mg to 1 g one to three times per week (Patient not taking: Reported on 9/15/2023)  Allergies: denied; seasonal allergies  Social History: denied smoking, alcohol use, or recreational drug use.  Family History: denied family history of cancer.  Family  "History   Problem Relation Age of Onset    Diabetes Father     Hypertension Father     Lipids Father     Cerebrovascular Disease Father         Late 70s    Thyroid Disease Sister     Glaucoma No family hx of     Macular Degeneration No family hx of      Physical Exam:   Vitals:    09/15/23 0910   BP: 116/77   BP Location: Left arm   Patient Position: Sitting   Cuff Size: Adult Regular   Pulse: 60   Resp: 16   Temp: 97.4  F (36.3  C)   Weight: 74.4 kg (164 lb)   Height: 1.753 m (5' 9\")      Estimated body mass index is 24.22 kg/m  as calculated from the following:    Height as of this encounter: 1.753 m (5' 9\").    Weight as of this encounter: 74.4 kg (164 lb).    General appearance: well-hydrated, A&O x 3, no apparent distress  Lungs: Equal expansion bilaterally, no accessory muscle use  Constitutional: See vitals  Extremities: No edema  Neuro: CN II-XII grossly intact  Genitourinary:  External genitalia: no erythema, no lesions.   Urethral meatus appropriate location without lesions or prolapse  Urethra: No masses, tenderness, or scarring  Bladder no fullness, masses, or tenderness.  Anus and Perineum: Unremarkable, no visible lesions  Vagina: Atrophy vagina with lack of vaginal rugae and vagina appears white in genera. Physiologic vaginal discharge.  Cervix: normal appearance, no cervical motion tenderness.   Uterus: normal size, shape and consistency.   Adnexa: no masses or tenderness bilaterally.    "

## 2024-01-17 ENCOUNTER — OFFICE VISIT (OUTPATIENT)
Dept: OBGYN | Facility: CLINIC | Age: 59
End: 2024-01-17
Payer: COMMERCIAL

## 2024-01-17 VITALS
WEIGHT: 158 LBS | TEMPERATURE: 97.9 F | DIASTOLIC BLOOD PRESSURE: 72 MMHG | RESPIRATION RATE: 18 BRPM | HEART RATE: 83 BPM | SYSTOLIC BLOOD PRESSURE: 102 MMHG | BODY MASS INDEX: 23.4 KG/M2 | HEIGHT: 69 IN

## 2024-01-17 DIAGNOSIS — N95.2 VAGINAL ATROPHY: ICD-10-CM

## 2024-01-17 DIAGNOSIS — N94.10 DYSPAREUNIA IN FEMALE: Primary | ICD-10-CM

## 2024-01-17 PROCEDURE — 99213 OFFICE O/P EST LOW 20 MIN: CPT | Performed by: STUDENT IN AN ORGANIZED HEALTH CARE EDUCATION/TRAINING PROGRAM

## 2024-01-17 NOTE — PROGRESS NOTES
"[facility]  Gynecology Office Note    Assessment and Plan:   Joanie Coe, 58 year old , presents for dyspareunia follow up.    Vulvovaginal atrophy  Dyspareunia  -Taper vaginal estrogen from daily to x3/wk for 1 month, then x2/wk for 1 month, then eventually to x1/wk. It's ok if she needs x2/wk or x1/wk as maintenance. Add on daily OTC vaginal moisturizer. Continue coconut oil as lubricant and ensure adequate usage.   -With her RLQ pain resolved, it's ok to not proceed with TVUS.    Return to follow up in 6-12 months pending on symptoms.     Regina Tapia MD  Obstetrics and Gynecology  [facility]   2024 3:03 PM    -----------------------------------------------------------------------------------------------------------------------------------    S: Joanie Coe, 58 year old , presents for dyspareunia follow up. She initially saw me on 9/15/2023 for ongoing sharp vaginal pains with penetration and vaginal dryness. She was diagnosed with vulvovaginal atrophy and put on vaginal estrogen cream daily. Pt is still taking the vaginal estrogen cream daily and using both watery based lubricant and coconut oil as lubricant for sexual intercourse.     Occasionally, she would have some burning/stinging sensation when applying the vaginal estrogen cream or with lubrication application. However, her dyspareunia is improving in general.     The RLQ pain resolved.     Physical Exam:   Vitals:    24 1433   BP: 102/72   BP Location: Right arm   Patient Position: Chair   Cuff Size: Adult Regular   Pulse: 83   Resp: 18   Temp: 97.9  F (36.6  C)   TempSrc: Tympanic   Weight: 71.7 kg (158 lb)   Height: 1.753 m (5' 9\")      Estimated body mass index is 23.33 kg/m  as calculated from the following:    Height as of this encounter: 1.753 m (5' 9\").    Weight as of this encounter: 71.7 kg (158 lb).    General appearance: well-hydrated, A&O x 3, no apparent " distress  Genitourinary:  External genitalia: no erythema, no lesions.   Urethral meatus appropriate location without lesions or prolapse  Urethra: No masses, tenderness, or scarring  Bladder no fullness, masses, or tenderness.  Anus and Perineum: Unremarkable, no visible lesions  Vagina: Atrophy vagina with lack of vaginal rugae and vagina appears white in general. Physiologic vaginal discharge. No lesions. No pain with digital exam.

## 2024-01-17 NOTE — NURSING NOTE
"Initial /72 (BP Location: Right arm, Patient Position: Chair, Cuff Size: Adult Regular)   Pulse 83   Temp 97.9  F (36.6  C) (Tympanic)   Resp 18   Ht 1.753 m (5' 9\")   Wt 71.7 kg (158 lb)   LMP 06/16/2014 (Approximate)   BMI 23.33 kg/m   Estimated body mass index is 23.33 kg/m  as calculated from the following:    Height as of this encounter: 1.753 m (5' 9\").    Weight as of this encounter: 71.7 kg (158 lb). .    "

## 2024-05-01 ENCOUNTER — PATIENT OUTREACH (OUTPATIENT)
Dept: CARE COORDINATION | Facility: CLINIC | Age: 59
End: 2024-05-01
Payer: COMMERCIAL

## 2024-05-29 ENCOUNTER — PATIENT OUTREACH (OUTPATIENT)
Dept: CARE COORDINATION | Facility: CLINIC | Age: 59
End: 2024-05-29
Payer: COMMERCIAL

## 2024-06-17 PROBLEM — Z71.89 ADVANCED DIRECTIVES, COUNSELING/DISCUSSION: Status: RESOLVED | Noted: 2019-09-05 | Resolved: 2024-06-17

## 2024-08-25 ENCOUNTER — HEALTH MAINTENANCE LETTER (OUTPATIENT)
Age: 59
End: 2024-08-25

## 2024-10-14 ENCOUNTER — TELEPHONE (OUTPATIENT)
Dept: FAMILY MEDICINE | Facility: CLINIC | Age: 59
End: 2024-10-14
Payer: COMMERCIAL

## 2024-10-14 NOTE — TELEPHONE ENCOUNTER
Patient Quality Outreach    Patient is due for the following:   Colon Cancer Screening  Breast Cancer Screening - Mammogram  Cervical Cancer Screening - PAP Needed  Physical Preventive Adult Physical    Next Steps:   Schedule a Adult Preventative    Type of outreach:    Sent [employer] message.    Next Steps:  Reach out within 90 days via Letter.    Max number of attempts reached: Yes. Will try again in 90 days if patient still on fail list.    Questions for provider review:    None           Melina ALVES LPN

## 2024-11-03 ENCOUNTER — HEALTH MAINTENANCE LETTER (OUTPATIENT)
Age: 59
End: 2024-11-03

## 2024-11-27 ENCOUNTER — PATIENT OUTREACH (OUTPATIENT)
Dept: CARE COORDINATION | Facility: CLINIC | Age: 59
End: 2024-11-27
Payer: COMMERCIAL

## 2024-12-03 ENCOUNTER — MYC MEDICAL ADVICE (OUTPATIENT)
Dept: FAMILY MEDICINE | Facility: CLINIC | Age: 59
End: 2024-12-03
Payer: COMMERCIAL

## 2024-12-03 ENCOUNTER — E-VISIT (OUTPATIENT)
Dept: URGENT CARE | Facility: CLINIC | Age: 59
End: 2024-12-03
Payer: COMMERCIAL

## 2024-12-03 DIAGNOSIS — R23.4 THICKENING, SKIN: Primary | ICD-10-CM

## 2024-12-03 PROCEDURE — 99207 PR NON-BILLABLE SERV PER CHARTING: CPT | Performed by: EMERGENCY MEDICINE

## 2024-12-09 ENCOUNTER — OFFICE VISIT (OUTPATIENT)
Dept: PODIATRY | Facility: CLINIC | Age: 59
End: 2024-12-09
Attending: EMERGENCY MEDICINE
Payer: COMMERCIAL

## 2024-12-09 VITALS
DIASTOLIC BLOOD PRESSURE: 79 MMHG | BODY MASS INDEX: 22.62 KG/M2 | SYSTOLIC BLOOD PRESSURE: 112 MMHG | HEIGHT: 70 IN | HEART RATE: 68 BPM | WEIGHT: 158 LBS

## 2024-12-09 DIAGNOSIS — L30.9 DERMATITIS OF RIGHT FOOT: Primary | ICD-10-CM

## 2024-12-09 PROCEDURE — 99213 OFFICE O/P EST LOW 20 MIN: CPT | Performed by: PODIATRIST

## 2024-12-09 RX ORDER — FLUTICASONE PROPIONATE 0.05 MG/G
OINTMENT TOPICAL 2 TIMES DAILY
Qty: 30 G | Refills: 0 | Status: SHIPPED | OUTPATIENT
Start: 2024-12-09 | End: 2024-12-23

## 2024-12-09 RX ORDER — KETOCONAZOLE 20 MG/G
CREAM TOPICAL DAILY
COMMUNITY

## 2024-12-09 NOTE — PROGRESS NOTES
"PATIENT HISTORY:  Joanie Coe is a 59 year old female who presents to clinic in consultation at the request of  Abraham Beltran M.D. with a chief complaint of a rash on the ball of the foot.  The patient is seen by themselves.  The patient relates the rash is primarily located around the ball of the foot.  Reports insidious onset without acute precipitating event.  The patient relates that the symptoms have been going on for several week(s).  The patient has previously tried antifungal cream with little relief.    Any previous notes and studies that pertain to the patient's condition were reviewed.    Pertinent medical, surgical and family history was reviewed in the [facility] chart.    Past Medical History:   Past Medical History:   Diagnosis Date    Need for prophylactic hormone replacement therapy (postmenopausal)     NO ACTIVE PROBLEMS        Medications:   Current Outpatient Medications:     fluticasone propionate (CUTIVATE) 0.005 % external ointment, Apply topically 2 times daily for 14 days., Disp: 30 g, Rfl: 0    ketoconazole (NIZORAL) 2 % external cream, Apply topically daily., Disp: , Rfl:     conjugated estrogens (PREMARIN) 0.625 MG/GM vaginal cream, Ok to use 0.5 g twice weekly (Patient not taking: Reported on 9/15/2023), Disp: 30 g, Rfl: 1    estradiol (ESTRACE) 0.1 MG/GM vaginal cream, 500 mg to 1 g/day intravaginally for 2 weeks, then 500 mg to 1 g one to three times per week, Disp: 42.5 g, Rfl: 1    loratadine (CLARITIN REDITABS) 10 MG ODT, Take 10 mg by mouth daily, Disp: , Rfl:     Omega-3 Fatty Acids (FISH OIL CONCENTRATE PO), , Disp: , Rfl:      Allergies:    Allergies   Allergen Reactions    Nka [No Known Allergies]        Vitals: /79   Pulse 68   Ht 1.778 m (5' 10\")   Wt 71.7 kg (158 lb)   LMP 06/16/2014 (Approximate)   BMI 22.67 kg/m    BMI= Body mass index is 22.67 kg/m .    LOWER EXTREMITY PHYSICAL EXAM    Dermatologic: Skin is intact to right lower extremity without significant lesions, " rash or abrasion.   Noted small epidermal blistering lesions behind the first metatarsophalangeal joint on the right.  No surrounding erythema or edema noted.     Vascular: DP & PT pulses are intact & regular on the right.   CFT and skin temperature is normal to the right lower extremity.     Neurologic: Lower extremity sensation is intact to light touch.  No evidence of weakness in the right lower extremity.        Musculoskeletal: Patient is ambulatory without assistive device or brace.  No gross ankle deformity noted.  No foot or ankle joint effusion is noted.              ASSESSMENT / PLAN:     ICD-10-CM    1. Dermatitis of right foot  L30.9 fluticasone propionate (CUTIVATE) 0.005 % external ointment          I have explained to Joanie about the conditions.  We discussed the underlying contributing factors to the condition as well as both conservative and surgical treatment options along with expected length of recovery.  At this time, the patient was prescribed cultivate 0.005% external ointment to be applied to the affected skin twice daily for 2 weeks.  The patient will return in 2 weeks for reevaluation.    Joanie verbalized agreement with and understanding of the rational for the diagnosis and treatment plan.  All questions were answered to best of my ability and the patient's satisfaction. The patient was advised to contact the clinic with any questions that may arise after the clinic visit.      Disclaimer: This note consists of symbols derived from keyboarding, dictation and/or voice recognition software. As a result, there may be errors in the script that have gone undetected. Please consider this when interpreting information found in this chart.       KATHRYN Preston D.P.M., [email or web address].[email or web address].F.A.S.

## 2024-12-09 NOTE — NURSING NOTE
"Chief Complaint   Patient presents with    Consult     Skin problem on ball of the foot       Initial /79   Pulse 68   Ht 1.778 m (5' 10\")   Wt 71.7 kg (158 lb)   LMP 06/16/2014 (Approximate)   BMI 22.67 kg/m   Estimated body mass index is 22.67 kg/m  as calculated from the following:    Height as of this encounter: 1.778 m (5' 10\").    Weight as of this encounter: 71.7 kg (158 lb).  Medications and allergies reviewed.      Felicia BALDWIN MA    "

## 2024-12-09 NOTE — LETTER
12/9/2024      Joanie Coe  [address] [address]      Dear Colleague,    Thank you for referring your patient, Joanie Coe, to the [facility] [place]. Please see a copy of my visit note below.    PATIENT HISTORY:  Joanie Coe is a 59 year old female who presents to clinic in consultation at the request of  Abraham Beltran M.D. with a chief complaint of a rash on the ball of the foot.  The patient is seen by themselves.  The patient relates the rash is primarily located around the ball of the foot.  Reports insidious onset without acute precipitating event.  The patient relates that the symptoms have been going on for several week(s).  The patient has previously tried antifungal cream with little relief.    Any previous notes and studies that pertain to the patient's condition were reviewed.    Pertinent medical, surgical and family history was reviewed in the Epic chart.    Past Medical History:   Past Medical History:   Diagnosis Date     Need for prophylactic hormone replacement therapy (postmenopausal)      NO ACTIVE PROBLEMS        Medications:   Current Outpatient Medications:      fluticasone propionate (CUTIVATE) 0.005 % external ointment, Apply topically 2 times daily for 14 days., Disp: 30 g, Rfl: 0     ketoconazole (NIZORAL) 2 % external cream, Apply topically daily., Disp: , Rfl:      conjugated estrogens (PREMARIN) 0.625 MG/GM vaginal cream, Ok to use 0.5 g twice weekly (Patient not taking: Reported on 9/15/2023), Disp: 30 g, Rfl: 1     estradiol (ESTRACE) 0.1 MG/GM vaginal cream, 500 mg to 1 g/day intravaginally for 2 weeks, then 500 mg to 1 g one to three times per week, Disp: 42.5 g, Rfl: 1     loratadine (CLARITIN REDITABS) 10 MG ODT, Take 10 mg by mouth daily, Disp: , Rfl:      Omega-3 Fatty Acids (FISH OIL CONCENTRATE PO), , Disp: , Rfl:      Allergies:    Allergies   Allergen Reactions     Nka [No Known Allergies]        Vitals: /79   Pulse 68    " 1.778 m (5' 10\")   Wt 71.7 kg (158 lb)   LMP 06/16/2014 (Approximate)   BMI 22.67 kg/m    BMI= Body mass index is 22.67 kg/m .    LOWER EXTREMITY PHYSICAL EXAM    Dermatologic: Skin is intact to right lower extremity without significant lesions, rash or abrasion.   Noted small epidermal blistering lesions behind the first metatarsophalangeal joint on the right.  No surrounding erythema or edema noted.     Vascular: DP & PT pulses are intact & regular on the right.   CFT and skin temperature is normal to the right lower extremity.     Neurologic: Lower extremity sensation is intact to light touch.  No evidence of weakness in the right lower extremity.        Musculoskeletal: Patient is ambulatory without assistive device or brace.  No gross ankle deformity noted.  No foot or ankle joint effusion is noted.              ASSESSMENT / PLAN:     ICD-10-CM    1. Dermatitis of right foot  L30.9 fluticasone propionate (CUTIVATE) 0.005 % external ointment          I have explained to Joanie about the conditions.  We discussed the underlying contributing factors to the condition as well as both conservative and surgical treatment options along with expected length of recovery.  At this time, the patient was prescribed cultivate 0.005% external ointment to be applied to the affected skin twice daily for 2 weeks.  The patient will return in 2 weeks for reevaluation.    Joanie verbalized agreement with and understanding of the rational for the diagnosis and treatment plan.  All questions were answered to best of my ability and the patient's satisfaction. The patient was advised to contact the clinic with any questions that may arise after the clinic visit.      Disclaimer: This note consists of symbols derived from keyboarding, dictation and/or voice recognition software. As a result, there may be errors in the script that have gone undetected. Please consider this when interpreting information found in this chart.       J. " Javy Preston D.P.M., F.A.C.F.A.S.      Again, thank you for allowing me to participate in the care of your patient.        Sincerely,        [place] DPM

## 2024-12-18 ENCOUNTER — VIRTUAL VISIT (OUTPATIENT)
Dept: FAMILY MEDICINE | Facility: CLINIC | Age: 59
End: 2024-12-18
Payer: COMMERCIAL

## 2024-12-18 DIAGNOSIS — B00.1 COLD SORE: ICD-10-CM

## 2024-12-18 DIAGNOSIS — Z12.31 VISIT FOR SCREENING MAMMOGRAM: ICD-10-CM

## 2024-12-18 DIAGNOSIS — Z12.11 SCREEN FOR COLON CANCER: Primary | ICD-10-CM

## 2024-12-18 PROCEDURE — 99213 OFFICE O/P EST LOW 20 MIN: CPT | Mod: 95 | Performed by: NURSE PRACTITIONER

## 2024-12-18 RX ORDER — VALACYCLOVIR HYDROCHLORIDE 1 G/1
2000 TABLET, FILM COATED ORAL 2 TIMES DAILY
Qty: 4 TABLET | Refills: 11 | Status: SHIPPED | OUTPATIENT
Start: 2024-12-18 | End: 2024-12-19

## 2024-12-18 RX ORDER — VALACYCLOVIR HYDROCHLORIDE 1 G/1
2000 TABLET, FILM COATED ORAL 2 TIMES DAILY
Qty: 12 TABLET | Refills: 3 | Status: CANCELLED | OUTPATIENT
Start: 2024-12-18

## 2024-12-18 NOTE — PROGRESS NOTES
Joanie is a 59 year old who is being evaluated via a billable video visit.    How would you like to obtain your AVS? MyChart  If the video visit is dropped, the invitation should be resent by: Text to cell phone: [phone]  Will anyone else be joining your video visit? No      Assessment & Plan     Cold sore  Treat with:  - valACYclovir (VALTREX) 1000 mg tablet; Take 2 tablets (2,000 mg) by mouth 2 times daily for 1 day.    Screen for colon cancer  - Colonoscopy Screening  Referral; Future    Visit for screening mammogram  - MA Screening Bilateral w/ Pedro; Future    The risks, benefits and treatment options of prescribed medications or other treatments have been discussed with the patient. The patient verbalized their understanding and should call or follow up if no improvement or if they develop further problems.  Melina Miranda, LOUANN              Subjective   Joanie is a 59 year old, presenting for the following health issues:  Refill Request (Treatment for cold sore.)        12/18/2024    11:06 AM   Additional Questions   Roomed by Eli HOOPER CMA - Virtual visit.         12/18/2024    11:06 AM   Patient Reported Additional Medications   Patient reports taking the following new medications none       Video Start Time: 11:30 AM    History of Present Illness       Reason for visit:  I have a cold sore that started on 12/16    She eats 2-3 servings of fruits and vegetables daily.She consumes 1 sweetened beverage(s) daily.She exercises with enough effort to increase her heart rate 30 to 60 minutes per day.  She exercises with enough effort to increase her heart rate 4 days per week.   She is taking medications regularly.     Has had them before - doesn't get them too often.  Valtrex works very well for her - would like a refill          Review of Systems  Constitutional, HEENT, cardiovascular, pulmonary, gi and gu systems are negative, except as otherwise noted.      Objective           Vitals:  No vitals were  obtained today due to virtual visit.    Physical Exam   GENERAL: alert and no distress  EYES: Eyes grossly normal to inspection.  No discharge or erythema, or obvious scleral/conjunctival abnormalities.  HENT: cold sore visible at the corner of her mouth  RESP: No audible wheeze, cough, or visible cyanosis.    SKIN: Visible skin clear. No significant rash, abnormal pigmentation or lesions.  NEURO: Cranial nerves grossly intact.  Mentation and speech appropriate for age.  PSYCH: Appropriate affect, tone, and pace of words          Video-Visit Details    Type of service:  Video Visit   Video End Time:11:34 AM  Originating Location (pt. Location): Home    Distant Location (provider location):  On-site  Platform used for Video Visit: Silas  Signed Electronically by: MARY LOU Spence CNP

## 2024-12-19 ENCOUNTER — PATIENT OUTREACH (OUTPATIENT)
Dept: CARE COORDINATION | Facility: CLINIC | Age: 59
End: 2024-12-19
Payer: COMMERCIAL

## 2025-01-04 ENCOUNTER — HOSPITAL ENCOUNTER (OUTPATIENT)
Dept: MAMMOGRAPHY | Facility: CLINIC | Age: 60
Discharge: HOME | End: 2025-01-04
Attending: NURSE PRACTITIONER | Admitting: NURSE PRACTITIONER
Payer: COMMERCIAL

## 2025-01-04 DIAGNOSIS — Z12.31 VISIT FOR SCREENING MAMMOGRAM: ICD-10-CM

## 2025-01-04 PROCEDURE — 77063 BREAST TOMOSYNTHESIS BI: CPT

## 2025-01-09 ENCOUNTER — MYC MEDICAL ADVICE (OUTPATIENT)
Dept: PODIATRY | Facility: CLINIC | Age: 60
End: 2025-01-09
Payer: COMMERCIAL

## 2025-01-09 DIAGNOSIS — L30.9 DERMATITIS OF RIGHT FOOT: Primary | ICD-10-CM

## 2025-01-09 NOTE — TELEPHONE ENCOUNTER
I placed a dermatology referral to address the additional blistering.  Please inform the patient.  Thank you.

## 2025-02-28 ENCOUNTER — ANESTHESIA EVENT (OUTPATIENT)
Dept: GASTROENTEROLOGY | Facility: CLINIC | Age: 60
End: 2025-02-28
Payer: COMMERCIAL

## 2025-02-28 NOTE — ANESTHESIA PREPROCEDURE EVALUATION
"Anesthesia Pre-Procedure Evaluation    Patient: Joanie Coe   MRN: [id number] : 1965        Procedure : Procedure(s):  Colonoscopy          Past Medical History:   Diagnosis Date    Need for prophylactic hormone replacement therapy (postmenopausal)     NO ACTIVE PROBLEMS       Past Surgical History:   Procedure Laterality Date    ARTHROSCOPY ANKLE, OPEN REPAIR LIGAMENT, COMBINED Right 2018    Procedure: COMBINED ARTHROSCOPY ANKLE, OPEN REPAIR LIGAMENT;  Right Ankle Arthroscopic Evaluation & Debridement &  Lateral Ligament Repair With Internal Augmentation &  Peroneal Tendon Repair & Great Toe Tendon Decompression;  Surgeon: Javy Gross DPM;  Location: [facility]    COLONOSCOPY  2014    Procedure: COLONOSCOPY;  Colonoscopy;  Surgeon: Everett Gray MD;  Location: [facility]    HC TOOTH EXTRACTION W/FORCEP      root canal    PELVIS LAPAROSCOPY,DX        Allergies   Allergen Reactions    Nka [No Known Allergies]       Social History     Tobacco Use    Smoking status: Never    Smokeless tobacco: Never   Substance Use Topics    Alcohol use: Yes     Comment: occ      Wt Readings from Last 1 Encounters:   24 71.7 kg (158 lb)        Anesthesia Evaluation   Pt has had prior anesthetic.         ROS/MED HX  ENT/Pulmonary:       Neurologic:       Cardiovascular:       METS/Exercise Tolerance:     Hematologic:       Musculoskeletal:       GI/Hepatic:       Renal/Genitourinary:       Endo:       Psychiatric/Substance Use:       Infectious Disease:       Malignancy:       Other:            Physical Exam    Airway  airway exam normal      Mallampati: I   TM distance: > 3 FB   Neck ROM: full   Mouth opening: > 3 cm    Respiratory Devices and Support         Dental           Cardiovascular   cardiovascular exam normal          Pulmonary   pulmonary exam normal                OUTSIDE LABS:  CBC: No results found for: \"WBC\", \"HGB\", \"HCT\", \"PLT\"  BMP:   Lab Results   Component Value Date     " "08/09/2022     09/05/2019    POTASSIUM 4.0 08/09/2022    POTASSIUM 3.9 09/05/2019    CHLORIDE 111 (H) 08/09/2022    CHLORIDE 110 (H) 09/05/2019    CO2 26 08/09/2022    CO2 26 09/05/2019    BUN 19 08/09/2022    BUN 15 09/05/2019    CR 0.74 08/09/2022    CR 0.80 09/05/2019    GLC 82 08/09/2022    GLC 75 09/05/2019     COAGS: No results found for: \"PTT\", \"INR\", \"FIBR\"  POC: No results found for: \"BGM\", \"HCG\", \"HCGS\"  HEPATIC:   Lab Results   Component Value Date    ALBUMIN 3.7 09/05/2019    PROTTOTAL 7.9 09/05/2019    ALT 29 09/05/2019    AST 20 09/05/2019    ALKPHOS 90 09/05/2019    BILITOTAL 0.4 09/05/2019     OTHER:   Lab Results   Component Value Date    JENNIE 9.3 08/09/2022    TSH 0.94 08/09/2022       Anesthesia Plan    ASA Status:  1    NPO Status:  NPO Appropriate    Anesthesia Type: General.   Induction: Intravenous, Propofol.   Maintenance: TIVA.        Consents    Anesthesia Plan(s) and associated risks, benefits, and realistic alternatives discussed. Questions answered and patient/representative(s) expressed understanding.     - Discussed: Risks, Benefits and Alternatives for BOTH SEDATION and the PROCEDURE were discussed     - Discussed with:  Patient      - Extended Intubation/Ventilatory Support Discussed: No.      - Patient is DNR/DNI Status: No     Use of blood products discussed: No .     Postoperative Care            Comments:               Tommy Miller, APRN CRNA    I have reviewed the pertinent notes and labs in the chart from the past 30 days and (re)examined the patient.  Any updates or changes from those notes are reflected in this note.    Clinically Significant Risk Factors Present on Admission                                          "

## 2025-03-03 ENCOUNTER — ANESTHESIA (OUTPATIENT)
Dept: GASTROENTEROLOGY | Facility: CLINIC | Age: 60
End: 2025-03-03
Payer: COMMERCIAL

## 2025-03-03 ENCOUNTER — HOSPITAL ENCOUNTER (OUTPATIENT)
Facility: CLINIC | Age: 60
Discharge: HOME | End: 2025-03-03
Attending: SURGERY | Admitting: SURGERY
Payer: COMMERCIAL

## 2025-03-03 VITALS
RESPIRATION RATE: 16 BRPM | SYSTOLIC BLOOD PRESSURE: 113 MMHG | TEMPERATURE: 97.9 F | OXYGEN SATURATION: 98 % | DIASTOLIC BLOOD PRESSURE: 72 MMHG | HEART RATE: 64 BPM

## 2025-03-03 LAB — COLONOSCOPY: NORMAL

## 2025-03-03 PROCEDURE — G0121 COLON CA SCRN NOT HI RSK IND: HCPCS | Performed by: SURGERY

## 2025-03-03 PROCEDURE — 250N000011 HC RX IP 250 OP 636: Performed by: NURSE ANESTHETIST, CERTIFIED REGISTERED

## 2025-03-03 PROCEDURE — 258N000003 HC RX IP 258 OP 636: Performed by: NURSE ANESTHETIST, CERTIFIED REGISTERED

## 2025-03-03 PROCEDURE — 370N000017 HC ANESTHESIA TECHNICAL FEE, PER MIN: Performed by: SURGERY

## 2025-03-03 PROCEDURE — 250N000009 HC RX 250: Performed by: NURSE ANESTHETIST, CERTIFIED REGISTERED

## 2025-03-03 PROCEDURE — 45378 DIAGNOSTIC COLONOSCOPY: CPT | Performed by: SURGERY

## 2025-03-03 RX ORDER — SODIUM CHLORIDE, SODIUM LACTATE, POTASSIUM CHLORIDE, CALCIUM CHLORIDE 600; 310; 30; 20 MG/100ML; MG/100ML; MG/100ML; MG/100ML
INJECTION, SOLUTION INTRAVENOUS CONTINUOUS PRN
Status: DISCONTINUED | OUTPATIENT
Start: 2025-03-03 | End: 2025-03-03

## 2025-03-03 RX ORDER — LIDOCAINE HYDROCHLORIDE 20 MG/ML
INJECTION, SOLUTION INFILTRATION; PERINEURAL PRN
Status: DISCONTINUED | OUTPATIENT
Start: 2025-03-03 | End: 2025-03-03

## 2025-03-03 RX ORDER — SODIUM CHLORIDE 9 MG/ML
INJECTION, SOLUTION INTRAVENOUS CONTINUOUS
Status: DISCONTINUED | OUTPATIENT
Start: 2025-03-03 | End: 2025-03-03 | Stop reason: HOSPADM

## 2025-03-03 RX ORDER — ONDANSETRON 4 MG/1
4 TABLET, ORALLY DISINTEGRATING ORAL EVERY 30 MIN PRN
Status: DISCONTINUED | OUTPATIENT
Start: 2025-03-03 | End: 2025-03-03 | Stop reason: HOSPADM

## 2025-03-03 RX ORDER — OXYCODONE HYDROCHLORIDE 5 MG/1
10 TABLET ORAL
Status: DISCONTINUED | OUTPATIENT
Start: 2025-03-03 | End: 2025-03-03 | Stop reason: HOSPADM

## 2025-03-03 RX ORDER — LIDOCAINE 40 MG/G
CREAM TOPICAL
Status: DISCONTINUED | OUTPATIENT
Start: 2025-03-03 | End: 2025-03-03 | Stop reason: HOSPADM

## 2025-03-03 RX ORDER — OXYCODONE HYDROCHLORIDE 5 MG/1
5 TABLET ORAL
Status: DISCONTINUED | OUTPATIENT
Start: 2025-03-03 | End: 2025-03-03 | Stop reason: HOSPADM

## 2025-03-03 RX ORDER — PROPOFOL 10 MG/ML
INJECTION, EMULSION INTRAVENOUS CONTINUOUS PRN
Status: DISCONTINUED | OUTPATIENT
Start: 2025-03-03 | End: 2025-03-03

## 2025-03-03 RX ORDER — NALOXONE HYDROCHLORIDE 0.4 MG/ML
0.1 INJECTION, SOLUTION INTRAMUSCULAR; INTRAVENOUS; SUBCUTANEOUS
Status: DISCONTINUED | OUTPATIENT
Start: 2025-03-03 | End: 2025-03-03 | Stop reason: HOSPADM

## 2025-03-03 RX ORDER — DEXAMETHASONE SODIUM PHOSPHATE 4 MG/ML
4 INJECTION, SOLUTION INTRA-ARTICULAR; INTRALESIONAL; INTRAMUSCULAR; INTRAVENOUS; SOFT TISSUE
Status: DISCONTINUED | OUTPATIENT
Start: 2025-03-03 | End: 2025-03-03 | Stop reason: HOSPADM

## 2025-03-03 RX ORDER — SODIUM CHLORIDE, SODIUM LACTATE, POTASSIUM CHLORIDE, CALCIUM CHLORIDE 600; 310; 30; 20 MG/100ML; MG/100ML; MG/100ML; MG/100ML
INJECTION, SOLUTION INTRAVENOUS CONTINUOUS
Status: DISCONTINUED | OUTPATIENT
Start: 2025-03-03 | End: 2025-03-03 | Stop reason: HOSPADM

## 2025-03-03 RX ORDER — ONDANSETRON 2 MG/ML
4 INJECTION INTRAMUSCULAR; INTRAVENOUS EVERY 30 MIN PRN
Status: DISCONTINUED | OUTPATIENT
Start: 2025-03-03 | End: 2025-03-03 | Stop reason: HOSPADM

## 2025-03-03 RX ADMIN — PROPOFOL 150 MCG/KG/MIN: 10 INJECTION, EMULSION INTRAVENOUS at 09:02

## 2025-03-03 RX ADMIN — SODIUM CHLORIDE, POTASSIUM CHLORIDE, SODIUM LACTATE AND CALCIUM CHLORIDE: 600; 310; 30; 20 INJECTION, SOLUTION INTRAVENOUS at 09:01

## 2025-03-03 RX ADMIN — LIDOCAINE HYDROCHLORIDE 100 MG: 20 INJECTION, SOLUTION INFILTRATION; PERINEURAL at 09:02

## 2025-03-03 ASSESSMENT — ACTIVITIES OF DAILY LIVING (ADL)
ADLS_ACUITY_SCORE: 41
ADLS_ACUITY_SCORE: 41

## 2025-03-03 NOTE — H&P
General Surgery H&P  Joanie Coe MRN# [id number]   Age/Sex: 59 year old female YOB: 1965     Reason for visit: Colonoscopy       Referring physician: Ruben LEW                   Assessment and Plan:   Assessment:  colonoscopy    Plan:  -To the OR for colonoscopy  -Risk and benefits of procedure explained detail to the patient.  Risks include infection, bleeding, damage to the surrounding structures and possible perforation of the hollow organs.  Patient verbalized understanding provided consent to undergo the procedure above.          Chief Complaint:   Presenting for colonoscopy     History is obtained from the patient    HPI:   Joanie Coe is a 59 year old female who presents for colonoscopy.  Patient tolerated the prep well.  The patient's last colonoscopy was 2014.  Family history shows no  history of colon cancer.  No new complaints.           Past Medical History:     Past Medical History:   Diagnosis Date    Need for prophylactic hormone replacement therapy (postmenopausal)     NO ACTIVE PROBLEMS               Past Surgical History:     Past Surgical History:   Procedure Laterality Date    ARTHROSCOPY ANKLE, OPEN REPAIR LIGAMENT, COMBINED Right 08/09/2018    Procedure: COMBINED ARTHROSCOPY ANKLE, OPEN REPAIR LIGAMENT;  Right Ankle Arthroscopic Evaluation & Debridement &  Lateral Ligament Repair With Internal Augmentation &  Peroneal Tendon Repair & Great Toe Tendon Decompression;  Surgeon: Javy Gross DPM;  Location: [facility]    COLONOSCOPY  03/28/2014    Procedure: COLONOSCOPY;  Colonoscopy;  Surgeon: Everett Gray MD;  Location: [facility]    HC TOOTH EXTRACTION W/FORCEP      root canal    PELVIS LAPAROSCOPY,DX               Social History:    reports that she has never smoked. She has never used smokeless tobacco. She reports current alcohol use. She reports that she does not use drugs.           Family History:     Family History   Problem Relation Age of Onset    Diabetes  Father     Hypertension Father     Lipids Father     Cerebrovascular Disease Father         Late 70s    Thyroid Disease Sister     Glaucoma No family hx of     Macular Degeneration No family hx of               Allergies:     Allergies   Allergen Reactions    Nka [No Known Allergies]               Medications:     Prior to Admission medications    Medication Sig Start Date End Date Taking? Authorizing Provider   conjugated estrogens (PREMARIN) 0.625 MG/GM vaginal cream Ok to use 0.5 g twice weekly  Patient not taking: Reported on 9/15/2023 7/20/23   Zuleika Scott MD   estradiol (ESTRACE) 0.1 MG/GM vaginal cream 500 mg to 1 g/day intravaginally for 2 weeks, then 500 mg to 1 g one to three times per week 7/27/23   Zuleika Soctt MD   ketoconazole (NIZORAL) 2 % external cream Apply topically daily.    Reported, Patient   loratadine (CLARITIN REDITABS) 10 MG ODT Take 10 mg by mouth daily    Reported, Patient   Omega-3 Fatty Acids (FISH OIL CONCENTRATE PO)     Reported, Patient   valACYclovir (VALTREX) 1000 mg tablet Take 2 tablets (2,000 mg) by mouth 2 times daily for 1 day. 12/18/24 12/19/24  Melina Miranda APRN CNP              Review of Systems:   A 12 point Review of Systems is negative other than noted in the HPI            Physical Exam:   No data found.     No intake or output data in the 24 hours ending 03/03/25 0702   Constitutional:   awake, alert, cooperative, no apparent distress, and appears stated age       Eyes:   PERRL, conjunctiva/corneas clear, EOM's intact; no scleral edema or icterus noted        ENT:   Normocephalic, without obvious abnormality, atraumatic, Lips, mucosa, and tongue normal        Hematologic / Lymphatic:   No lymphadenopathy       Lungs:   Normal respiratory effort, no accessory muscle use, breath sounds bilaterally on auscultation       Cardiovascular:   Regular rate and rhythm       Abdomen:   Soft, nondistended, nontender to palpation       Musculoskeletal:   No obvious  swelling, bruising or deformity       Skin:   Skin color and texture normal for patient, no rashes or lesions              Data:          DO Julio Infante DO  General Surgeon  [facility]  Surgery [facility] - [address] [address]?  Office: [phone]  Employed by - [employer]  Pager: [phone]

## 2025-03-03 NOTE — ANESTHESIA CARE TRANSFER NOTE
Patient: Joanie Coe    Procedure: Procedure(s):  Colonoscopy       Diagnosis: Screen for colon cancer [Z12.11]  Diagnosis Additional Information: No value filed.    Anesthesia Type:   General     Note:    Oropharynx: oropharynx clear of all foreign objects  Level of Consciousness: awake  Oxygen Supplementation: room air    Independent Airway: airway patency satisfactory and stable  Dentition: dentition unchanged  Vital Signs Stable: post-procedure vital signs reviewed and stable  Report to RN Given: handoff report given  Patient transferred to: Phase II    Handoff Report: Identifed the Patient, Identified the Reponsible Provider, Reviewed the pertinent medical history, Discussed the surgical course, Reviewed Intra-OP anesthesia mangement and issues during anesthesia, Set expectations for post-procedure period and Allowed opportunity for questions and acknowledgement of understanding      Vitals:  Vitals Value Taken Time   /67 03/03/25 0930   Temp     Pulse 63 03/03/25 0930   Resp     SpO2 99 % 03/03/25 0935   Vitals shown include unfiled device data.    Electronically Signed By: MARY LOU Alvarado CRNA  March 3, 2025  9:36 AM

## 2025-03-03 NOTE — ANESTHESIA POSTPROCEDURE EVALUATION
Patient: Joanie Coe    Procedure: Procedure(s):  Colonoscopy       Anesthesia Type:  General    Note:  Disposition: Outpatient   Postop Pain Control: Uneventful            Sign Out: Well controlled pain   PONV: No   Neuro/Psych: Uneventful            Sign Out: Acceptable/Baseline neuro status   Airway/Respiratory: Uneventful            Sign Out: Acceptable/Baseline resp. status   CV/Hemodynamics: Uneventful            Sign Out: Acceptable CV status; No obvious hypovolemia; No obvious fluid overload   Other NRE: NONE   DID A NON-ROUTINE EVENT OCCUR? No           Last vitals:  Vitals Value Taken Time   /67 03/03/25 0930   Temp     Pulse 63 03/03/25 0930   Resp 16 03/03/25 0930   SpO2 99 % 03/03/25 0935   Vitals shown include unfiled device data.    Electronically Signed By: MARY LOU Alvarado CRNA  March 3, 2025  9:37 AM

## 2025-03-03 NOTE — DISCHARGE INSTRUCTIONS
"Learning About Colonoscopy  What is a colonoscopy?     A colonoscopy is a test (also called a procedure) that lets a doctor look inside your large intestine. The doctor uses a thin, lighted tube called a colonoscope. The doctor uses it to look for small growths called polyps, colon or rectal cancer (colorectal cancer), or other problems like bleeding.  During the procedure, the doctor can take samples of tissue. The samples can then be checked for cancer or other conditions. The doctor can also take out polyps.  How is a colonoscopy done?  This procedure is done in a doctor's office or a clinic or hospital. You will get medicine to help you relax and not feel pain. Some people find that they don't remember having the test because of the medicine.  The doctor gently moves the colonoscope, or scope, through the colon. The scope is also a small video camera. It lets the doctor see the colon and take pictures.  How do you prepare for the procedure?  You need to clean out your colon before the procedure so the doctor can see your colon. This depends on which \"colon prep\" your doctor recommends.  To clean out your colon, you'll do a \"colon prep\" before the test. This means you stop eating solid foods and drink only clear liquids. You can have water, tea, coffee, clear juices, clear broths, flavored ice pops, and gelatin (such as Jell-O). Do not drink anything red or purple.  The day or night before the procedure, you drink a large amount of a special liquid. This causes loose, frequent stools. You will go to the bathroom a lot. Your doctor may have you drink part of the liquid the evening before and the rest on the day of the test. It's very important to drink all of the liquid. If you have problems drinking it, call your doctor.  Arrange to have someone take you home after the test.  What can you expect after a colonoscopy?  Your doctor will tell you when you can eat and do your usual activities.  Drink a lot of fluid " "after the test to replace the fluids you may have lost during the colon prep. But don't drink alcohol.  Your doctor will talk to you about when you'll need your next colonoscopy. The results of your test and your risk for colorectal cancer will help your doctor decide how often you need to be checked.  After the test, you may be bloated or have gas pains. You may need to pass gas. If a biopsy was done or a polyp was removed, you may have streaks of blood in your stool (feces) for a few days. Check with your doctor to see when it is safe to take aspirin and nonsteroidal anti-inflammatory drugs (NSAIDs) again.  Problems such as heavy rectal bleeding may not occur until several weeks after the test. This isn't common. But it can happen after polyps are removed.  Follow-up care is a key part of your treatment and safety. Be sure to make and go to all appointments, and call your doctor if you are having problems. It's also a good idea to know your test results and keep a list of the medicines you take.  Where can you learn more?  Go to https://www.[employer].net/patiented  Enter Z368 in the search box to learn more about \"Learning About Colonoscopy.\"  Current as of: October 25, 2023  Content Version: 14.3    2024 [employer].   Care instructions adapted under license by your healthcare professional. If you have questions about a medical condition or this instruction, always ask your healthcare professional. [employer] disclaims any warranty or liability for your use of this information.    "

## (undated) DEVICE — SU ETHILON 3-0 FS-1 18" 669H

## (undated) DEVICE — GOWN LG DISP 9515

## (undated) DEVICE — BLADE KNIFE SURG 15 371115

## (undated) DEVICE — SPLINT FIBERGLASS 4X30" PRE-CUT RESIN 76430

## (undated) DEVICE — CAST PADDING 4" STERILE 9044S

## (undated) DEVICE — DRAPE STERI TOWEL SM 1000

## (undated) DEVICE — Device

## (undated) DEVICE — DRAPE SHEET REV FOLD 3/4 9349

## (undated) DEVICE — SOL NACL 0.9% IRRIG 1000ML BOTTLE 07138-09

## (undated) DEVICE — SYR BULB IRRIG 50ML LATEX FREE 0035280

## (undated) DEVICE — BLADE SHAVER ARTHRO 4.2MM FULL RADIUS 9247A

## (undated) DEVICE — PREP CHLORAPREP 26ML TINTED ORANGE  260815

## (undated) DEVICE — CAST PADDING 6" WEBRIL UNSTERILE 3489

## (undated) DEVICE — SOL NACL 0.9% 100ML BAG 2B1302

## (undated) DEVICE — DRSG GAUZE 4X4" TRAY

## (undated) DEVICE — SU VICRYL 2-0 SH 27" UND J417H

## (undated) DEVICE — PACK ARTHROSCOPY KNEE LATEX FREE SOP32CAFCN

## (undated) DEVICE — DRSG JUMPSTART ANTIMICROBIAL 4X4" ABS-4004

## (undated) DEVICE — SU FIBERWIRE 2-0 TAPER CUT AR-7220

## (undated) DEVICE — DECANTER VIAL 2006S

## (undated) DEVICE — CAST PADDING 4" WEBRIL UNSTERILE

## (undated) DEVICE — ESU PENCIL W/COATED BLADE E2450H

## (undated) DEVICE — BNDG ESMARK 4" STERILE 836-3412

## (undated) DEVICE — GLOVE PROTEXIS W/NEU-THERA 8.5  2D73TE85

## (undated) DEVICE — NDL 18GA 1.5" 305196

## (undated) DEVICE — SOL NACL 0.9% IRRIG 3000ML BAG 07972-08

## (undated) DEVICE — GLOVE PROTEXIS BLUE W/NEU-THERA 6.5  2D73EB65

## (undated) DEVICE — GLOVE PROTEXIS W/NEU-THERA 6.5  2D73TE65

## (undated) DEVICE — DISTRACTOR STRAP ANKLE ARTHRO AR-1712

## (undated) DEVICE — DECANTER BAG 2002S

## (undated) DEVICE — NDL 22GA 1.5"

## (undated) DEVICE — GLOVE PROTEXIS POWDER FREE 8.0 ORTHOPEDIC 2D73ET80

## (undated) DEVICE — GLOVE PROTEXIS POWDER FREE 7.5 ORTHOPEDIC 2D73ET75

## (undated) DEVICE — TUBING PUMP LINVATEC 10K150

## (undated) RX ORDER — LIDOCAINE HYDROCHLORIDE AND EPINEPHRINE 15; 5 MG/ML; UG/ML
INJECTION, SOLUTION EPIDURAL
Status: DISPENSED
Start: 2018-08-09

## (undated) RX ORDER — BUPIVACAINE HYDROCHLORIDE 5 MG/ML
INJECTION, SOLUTION PERINEURAL
Status: DISPENSED
Start: 2018-08-09

## (undated) RX ORDER — PROPOFOL 10 MG/ML
INJECTION, EMULSION INTRAVENOUS
Status: DISPENSED
Start: 2018-08-09

## (undated) RX ORDER — ONDANSETRON 2 MG/ML
INJECTION INTRAMUSCULAR; INTRAVENOUS
Status: DISPENSED
Start: 2018-08-09

## (undated) RX ORDER — LIDOCAINE HYDROCHLORIDE 10 MG/ML
INJECTION, SOLUTION EPIDURAL; INFILTRATION; INTRACAUDAL; PERINEURAL
Status: DISPENSED
Start: 2018-08-09

## (undated) RX ORDER — FENTANYL CITRATE 50 UG/ML
INJECTION, SOLUTION INTRAMUSCULAR; INTRAVENOUS
Status: DISPENSED
Start: 2018-08-09

## (undated) RX ORDER — DEXAMETHASONE SODIUM PHOSPHATE 4 MG/ML
INJECTION, SOLUTION INTRA-ARTICULAR; INTRALESIONAL; INTRAMUSCULAR; INTRAVENOUS; SOFT TISSUE
Status: DISPENSED
Start: 2018-08-09

## (undated) RX ORDER — ROPIVACAINE HYDROCHLORIDE 5 MG/ML
INJECTION, SOLUTION EPIDURAL; INFILTRATION; PERINEURAL
Status: DISPENSED
Start: 2018-08-09

## (undated) RX ORDER — GLYCOPYRROLATE 0.2 MG/ML
INJECTION, SOLUTION INTRAMUSCULAR; INTRAVENOUS
Status: DISPENSED
Start: 2018-08-09